# Patient Record
Sex: MALE | Race: BLACK OR AFRICAN AMERICAN | Employment: UNEMPLOYED | URBAN - METROPOLITAN AREA
[De-identification: names, ages, dates, MRNs, and addresses within clinical notes are randomized per-mention and may not be internally consistent; named-entity substitution may affect disease eponyms.]

---

## 2017-01-01 ENCOUNTER — APPOINTMENT (OUTPATIENT)
Dept: GENERAL RADIOLOGY | Facility: HOSPITAL | Age: 62
DRG: 441 | End: 2017-01-01
Attending: INTERNAL MEDICINE
Payer: MEDICARE

## 2017-01-01 ENCOUNTER — HOSPITAL ENCOUNTER (INPATIENT)
Facility: HOSPITAL | Age: 62
LOS: 1 days | Discharge: ANOTHER HOSPITAL | DRG: 441 | End: 2017-01-01
Attending: EMERGENCY MEDICINE | Admitting: HOSPITALIST
Payer: MEDICARE

## 2017-01-01 ENCOUNTER — APPOINTMENT (OUTPATIENT)
Dept: CT IMAGING | Facility: HOSPITAL | Age: 62
DRG: 441 | End: 2017-01-01
Attending: EMERGENCY MEDICINE
Payer: MEDICARE

## 2017-01-01 ENCOUNTER — HOSPITAL ENCOUNTER (INPATIENT)
Facility: HOSPITAL | Age: 62
LOS: 9 days | Discharge: NURSING, REHAB OR LONG-TERM CARE | DRG: 441 | End: 2018-01-01
Attending: EMERGENCY MEDICINE | Admitting: INTERNAL MEDICINE
Payer: MEDICARE

## 2017-01-01 ENCOUNTER — APPOINTMENT (OUTPATIENT)
Dept: GENERAL RADIOLOGY | Facility: HOSPITAL | Age: 62
DRG: 441 | End: 2017-01-01
Payer: MEDICARE

## 2017-01-01 ENCOUNTER — APPOINTMENT (OUTPATIENT)
Dept: ULTRASOUND IMAGING | Facility: HOSPITAL | Age: 62
DRG: 441 | End: 2017-01-01
Attending: PHYSICIAN ASSISTANT
Payer: MEDICARE

## 2017-01-01 ENCOUNTER — APPOINTMENT (OUTPATIENT)
Dept: GENERAL RADIOLOGY | Facility: HOSPITAL | Age: 62
DRG: 441 | End: 2017-01-01
Attending: EMERGENCY MEDICINE
Payer: MEDICARE

## 2017-01-01 VITALS
HEART RATE: 118 BPM | SYSTOLIC BLOOD PRESSURE: 122 MMHG | OXYGEN SATURATION: 94 % | TEMPERATURE: 98 F | RESPIRATION RATE: 14 BRPM | DIASTOLIC BLOOD PRESSURE: 52 MMHG

## 2017-01-01 DIAGNOSIS — Z99.2 END-STAGE RENAL DISEASE ON HEMODIALYSIS (HCC): ICD-10-CM

## 2017-01-01 DIAGNOSIS — N39.0 URINARY TRACT INFECTION WITH HEMATURIA, SITE UNSPECIFIED: ICD-10-CM

## 2017-01-01 DIAGNOSIS — N18.6 END-STAGE RENAL DISEASE ON HEMODIALYSIS (HCC): ICD-10-CM

## 2017-01-01 DIAGNOSIS — E87.79 OTHER HYPERVOLEMIA: ICD-10-CM

## 2017-01-01 DIAGNOSIS — K72.90 HEPATIC ENCEPHALOPATHY (HCC): Primary | ICD-10-CM

## 2017-01-01 DIAGNOSIS — R41.82 ALTERED MENTAL STATUS, UNSPECIFIED ALTERED MENTAL STATUS TYPE: Primary | ICD-10-CM

## 2017-01-01 DIAGNOSIS — K72.90 HEPATIC ENCEPHALOPATHY (HCC): ICD-10-CM

## 2017-01-01 DIAGNOSIS — E87.70 HYPERVOLEMIA, UNSPECIFIED HYPERVOLEMIA TYPE: ICD-10-CM

## 2017-01-01 DIAGNOSIS — R31.9 URINARY TRACT INFECTION WITH HEMATURIA, SITE UNSPECIFIED: ICD-10-CM

## 2017-01-01 LAB
ALBUMIN SERPL BCP-MCNC: 1.4 G/DL (ref 3.5–4.8)
ALBUMIN SERPL BCP-MCNC: 1.6 G/DL (ref 3.5–4.8)
ALBUMIN SERPL BCP-MCNC: 1.7 G/DL (ref 3.5–4.8)
ALBUMIN SERPL BCP-MCNC: 1.8 G/DL (ref 3.5–4.8)
ALBUMIN SERPL BCP-MCNC: 1.9 G/DL (ref 3.5–4.8)
ALBUMIN/GLOB SERPL: 0.4 {RATIO} (ref 1–2)
ALBUMIN/GLOB SERPL: 0.4 {RATIO} (ref 1–2)
ALBUMIN/GLOB SERPL: 0.5 {RATIO} (ref 1–2)
ALBUMIN/GLOB SERPL: 0.6 {RATIO} (ref 1–2)
ALP SERPL-CCNC: 129 U/L (ref 32–100)
ALP SERPL-CCNC: 133 U/L (ref 32–100)
ALP SERPL-CCNC: 143 U/L (ref 32–100)
ALP SERPL-CCNC: 171 U/L (ref 32–100)
ALP SERPL-CCNC: 201 U/L (ref 32–100)
ALT SERPL-CCNC: 28 U/L (ref 17–63)
ALT SERPL-CCNC: 30 U/L (ref 17–63)
ALT SERPL-CCNC: 35 U/L (ref 17–63)
ALT SERPL-CCNC: 42 U/L (ref 17–63)
ALT SERPL-CCNC: 44 U/L (ref 17–63)
AMMONIA PLAS-MCNC: 106 UG/DL (ref 28.2–80.4)
AMMONIA PLAS-MCNC: 126 UG/DL (ref 28.2–80.4)
AMMONIA PLAS-MCNC: 43 UG/DL (ref 28.2–80.4)
AMMONIA PLAS-MCNC: 527 UG/DL (ref 28.2–80.4)
ANION GAP SERPL CALC-SCNC: 10 MMOL/L (ref 0–18)
ANION GAP SERPL CALC-SCNC: 10 MMOL/L (ref 0–18)
ANION GAP SERPL CALC-SCNC: 11 MMOL/L (ref 0–18)
ANION GAP SERPL CALC-SCNC: 8 MMOL/L (ref 0–18)
ANION GAP SERPL CALC-SCNC: 8 MMOL/L (ref 0–18)
ANION GAP SERPL CALC-SCNC: 9 MMOL/L (ref 0–18)
APTT PPP: 35.6 SECONDS (ref 23.2–35.3)
AST SERPL-CCNC: 55 U/L (ref 15–41)
AST SERPL-CCNC: 64 U/L (ref 15–41)
AST SERPL-CCNC: 66 U/L (ref 15–41)
AST SERPL-CCNC: 82 U/L (ref 15–41)
AST SERPL-CCNC: 84 U/L (ref 15–41)
BASE EXCESS BLD CALC-SCNC: -4.1 MMOL/L (ref ?–2)
BASOPHILS # BLD: 0 K/UL (ref 0–0.2)
BASOPHILS # BLD: 0 K/UL (ref 0–0.2)
BASOPHILS # BLD: 0.1 K/UL (ref 0–0.2)
BASOPHILS NFR BLD: 1 %
BASOPHILS NFR BLD: 2 %
BILIRUB DIRECT SERPL-MCNC: 0.7 MG/DL (ref 0–0.2)
BILIRUB SERPL-MCNC: 1.8 MG/DL (ref 0.3–1.2)
BILIRUB SERPL-MCNC: 1.8 MG/DL (ref 0.3–1.2)
BILIRUB SERPL-MCNC: 2.1 MG/DL (ref 0.3–1.2)
BILIRUB SERPL-MCNC: 2.3 MG/DL (ref 0.3–1.2)
BILIRUB SERPL-MCNC: 2.8 MG/DL (ref 0.3–1.2)
BILIRUB UR QL: NEGATIVE
BUN SERPL-MCNC: 19 MG/DL (ref 8–20)
BUN SERPL-MCNC: 25 MG/DL (ref 8–20)
BUN SERPL-MCNC: 28 MG/DL (ref 8–20)
BUN SERPL-MCNC: 30 MG/DL (ref 8–20)
BUN SERPL-MCNC: 31 MG/DL (ref 8–20)
BUN SERPL-MCNC: 32 MG/DL (ref 8–20)
BUN/CREAT SERPL: 3.6 (ref 10–20)
BUN/CREAT SERPL: 3.7 (ref 10–20)
BUN/CREAT SERPL: 4.5 (ref 10–20)
BUN/CREAT SERPL: 4.7 (ref 10–20)
BUN/CREAT SERPL: 4.7 (ref 10–20)
BUN/CREAT SERPL: 4.8 (ref 10–20)
CALCIUM SERPL-MCNC: 7.4 MG/DL (ref 8.5–10.5)
CALCIUM SERPL-MCNC: 7.5 MG/DL (ref 8.5–10.5)
CALCIUM SERPL-MCNC: 7.8 MG/DL (ref 8.5–10.5)
CALCIUM SERPL-MCNC: 7.9 MG/DL (ref 8.5–10.5)
CALCIUM SERPL-MCNC: 7.9 MG/DL (ref 8.5–10.5)
CALCIUM SERPL-MCNC: 8 MG/DL (ref 8.5–10.5)
CHLORIDE SERPL-SCNC: 101 MMOL/L (ref 95–110)
CHLORIDE SERPL-SCNC: 103 MMOL/L (ref 95–110)
CHLORIDE SERPL-SCNC: 104 MMOL/L (ref 95–110)
CHLORIDE SERPL-SCNC: 104 MMOL/L (ref 95–110)
CHLORIDE SERPL-SCNC: 105 MMOL/L (ref 95–110)
CHLORIDE SERPL-SCNC: 105 MMOL/L (ref 95–110)
CO2 SERPL-SCNC: 19 MMOL/L (ref 22–32)
CO2 SERPL-SCNC: 20 MMOL/L (ref 22–32)
CO2 SERPL-SCNC: 21 MMOL/L (ref 22–32)
CO2 SERPL-SCNC: 21 MMOL/L (ref 22–32)
CO2 SERPL-SCNC: 23 MMOL/L (ref 22–32)
CO2 SERPL-SCNC: 23 MMOL/L (ref 22–32)
CREAT SERPL-MCNC: 5.34 MG/DL (ref 0.5–1.5)
CREAT SERPL-MCNC: 5.94 MG/DL (ref 0.5–1.5)
CREAT SERPL-MCNC: 6.51 MG/DL (ref 0.5–1.5)
CREAT SERPL-MCNC: 6.62 MG/DL (ref 0.5–1.5)
CREAT SERPL-MCNC: 6.71 MG/DL (ref 0.5–1.5)
CREAT SERPL-MCNC: 6.76 MG/DL (ref 0.5–1.5)
EOSINOPHIL # BLD: 0.2 K/UL (ref 0–0.7)
EOSINOPHIL # BLD: 0.3 K/UL (ref 0–0.7)
EOSINOPHIL NFR BLD: 3 %
EOSINOPHIL NFR BLD: 3 %
EOSINOPHIL NFR BLD: 4 %
EOSINOPHIL NFR BLD: 5 %
EOSINOPHIL NFR BLD: 7 %
ERYTHROCYTE [DISTWIDTH] IN BLOOD BY AUTOMATED COUNT: 18.8 % (ref 11–15)
ERYTHROCYTE [DISTWIDTH] IN BLOOD BY AUTOMATED COUNT: 18.9 % (ref 11–15)
ERYTHROCYTE [DISTWIDTH] IN BLOOD BY AUTOMATED COUNT: 19 % (ref 11–15)
ERYTHROCYTE [DISTWIDTH] IN BLOOD BY AUTOMATED COUNT: 19.2 % (ref 11–15)
ERYTHROCYTE [DISTWIDTH] IN BLOOD BY AUTOMATED COUNT: 19.4 % (ref 11–15)
ERYTHROCYTE [DISTWIDTH] IN BLOOD BY AUTOMATED COUNT: 19.6 % (ref 11–15)
GLOBULIN PLAS-MCNC: 3.3 G/DL (ref 2.5–3.7)
GLOBULIN PLAS-MCNC: 3.7 G/DL (ref 2.5–3.7)
GLOBULIN PLAS-MCNC: 3.9 G/DL (ref 2.5–3.7)
GLOBULIN PLAS-MCNC: 4.5 G/DL (ref 2.5–3.7)
GLUCOSE BLDC GLUCOMTR-MCNC: 101 MG/DL (ref 70–99)
GLUCOSE BLDC GLUCOMTR-MCNC: 109 MG/DL (ref 70–99)
GLUCOSE BLDC GLUCOMTR-MCNC: 112 MG/DL (ref 70–99)
GLUCOSE BLDC GLUCOMTR-MCNC: 74 MG/DL (ref 70–99)
GLUCOSE SERPL-MCNC: 118 MG/DL (ref 70–99)
GLUCOSE SERPL-MCNC: 121 MG/DL (ref 70–99)
GLUCOSE SERPL-MCNC: 126 MG/DL (ref 70–99)
GLUCOSE SERPL-MCNC: 144 MG/DL (ref 70–99)
GLUCOSE SERPL-MCNC: 145 MG/DL (ref 70–99)
GLUCOSE SERPL-MCNC: 83 MG/DL (ref 70–99)
GLUCOSE UR-MCNC: NEGATIVE MG/DL
HCO3 BLDA-SCNC: 19.9 MEQ/L (ref 21–27)
HCT VFR BLD AUTO: 29.3 % (ref 41–52)
HCT VFR BLD AUTO: 32.4 % (ref 41–52)
HCT VFR BLD AUTO: 33.2 % (ref 41–52)
HCT VFR BLD AUTO: 34.5 % (ref 41–52)
HCT VFR BLD AUTO: 35.4 % (ref 41–52)
HCT VFR BLD AUTO: 37 % (ref 41–52)
HGB BLD-MCNC: 10.4 G/DL (ref 13.5–17.5)
HGB BLD-MCNC: 11.1 G/DL (ref 13.5–17.5)
HGB BLD-MCNC: 11.3 G/DL (ref 13.5–17.5)
HGB BLD-MCNC: 11.8 G/DL (ref 13.5–17.5)
HGB BLD-MCNC: 12.1 G/DL (ref 13.5–17.5)
HGB BLD-MCNC: 9.9 G/DL (ref 13.5–17.5)
INR BLD: 1.4 (ref 0.9–1.2)
INR BLD: 1.5 (ref 0.9–1.2)
KETONES UR-MCNC: NEGATIVE MG/DL
LACTATE SERPL-SCNC: 2.6 MMOL/L (ref 0.5–2.2)
LACTATE SERPL-SCNC: 2.9 MMOL/L (ref 0.5–2.2)
LACTATE SERPL-SCNC: 3 MMOL/L (ref 0.5–2.2)
LACTATE SERPL-SCNC: 3.1 MMOL/L (ref 0.5–2.2)
LYMPHOCYTES # BLD: 0.9 K/UL (ref 1–4)
LYMPHOCYTES # BLD: 1 K/UL (ref 1–4)
LYMPHOCYTES # BLD: 1.1 K/UL (ref 1–4)
LYMPHOCYTES # BLD: 1.2 K/UL (ref 1–4)
LYMPHOCYTES # BLD: 1.5 K/UL (ref 1–4)
LYMPHOCYTES NFR BLD: 13 %
LYMPHOCYTES NFR BLD: 18 %
LYMPHOCYTES NFR BLD: 22 %
MAGNESIUM SERPL-MCNC: 1.7 MG/DL (ref 1.8–2.5)
MAGNESIUM SERPL-MCNC: 1.8 MG/DL (ref 1.8–2.5)
MCH RBC QN AUTO: 32.9 PG (ref 27–32)
MCH RBC QN AUTO: 33.4 PG (ref 27–32)
MCH RBC QN AUTO: 33.8 PG (ref 27–32)
MCH RBC QN AUTO: 33.8 PG (ref 27–32)
MCH RBC QN AUTO: 34 PG (ref 27–32)
MCH RBC QN AUTO: 34.2 PG (ref 27–32)
MCHC RBC AUTO-ENTMCNC: 32.2 G/DL (ref 32–37)
MCHC RBC AUTO-ENTMCNC: 32.8 G/DL (ref 32–37)
MCHC RBC AUTO-ENTMCNC: 32.8 G/DL (ref 32–37)
MCHC RBC AUTO-ENTMCNC: 33.3 G/DL (ref 32–37)
MCHC RBC AUTO-ENTMCNC: 33.4 G/DL (ref 32–37)
MCHC RBC AUTO-ENTMCNC: 33.7 G/DL (ref 32–37)
MCV RBC AUTO: 100.2 FL (ref 80–100)
MCV RBC AUTO: 101.4 FL (ref 80–100)
MCV RBC AUTO: 101.4 FL (ref 80–100)
MCV RBC AUTO: 102.3 FL (ref 80–100)
MCV RBC AUTO: 103 FL (ref 80–100)
MCV RBC AUTO: 103.6 FL (ref 80–100)
MODIFIED ALLEN TEST: POSITIVE
MONOCYTES # BLD: 0.8 K/UL (ref 0–1)
MONOCYTES # BLD: 0.8 K/UL (ref 0–1)
MONOCYTES # BLD: 0.9 K/UL (ref 0–1)
MONOCYTES # BLD: 1.3 K/UL (ref 0–1)
MONOCYTES # BLD: 1.5 K/UL (ref 0–1)
MONOCYTES NFR BLD: 15 %
MONOCYTES NFR BLD: 16 %
MONOCYTES NFR BLD: 17 %
MONOCYTES NFR BLD: 17 %
MONOCYTES NFR BLD: 19 %
MRSA DNA SPEC QL NAA+PROBE: NEGATIVE
NEUTROPHILS # BLD AUTO: 2.8 K/UL (ref 1.8–7.7)
NEUTROPHILS # BLD AUTO: 3.2 K/UL (ref 1.8–7.7)
NEUTROPHILS # BLD AUTO: 3.5 K/UL (ref 1.8–7.7)
NEUTROPHILS # BLD AUTO: 4.9 K/UL (ref 1.8–7.7)
NEUTROPHILS # BLD AUTO: 5.3 K/UL (ref 1.8–7.7)
NEUTROPHILS NFR BLD: 57 %
NEUTROPHILS NFR BLD: 58 %
NEUTROPHILS NFR BLD: 60 %
NEUTROPHILS NFR BLD: 62 %
NEUTROPHILS NFR BLD: 64 %
NITRITE UR QL STRIP.AUTO: NEGATIVE
O2 CT BLD-SCNC: 12.8 VOL% (ref 15–23)
O2/TOTAL GAS SETTING VFR VENT: 40 %
OSMOLALITY UR CALC.SUM OF ELEC: 281 MOSM/KG (ref 275–295)
OSMOLALITY UR CALC.SUM OF ELEC: 283 MOSM/KG (ref 275–295)
OSMOLALITY UR CALC.SUM OF ELEC: 285 MOSM/KG (ref 275–295)
OSMOLALITY UR CALC.SUM OF ELEC: 286 MOSM/KG (ref 275–295)
OSMOLALITY UR CALC.SUM OF ELEC: 286 MOSM/KG (ref 275–295)
OSMOLALITY UR CALC.SUM OF ELEC: 289 MOSM/KG (ref 275–295)
PCO2 BLDA: 34 MM HG (ref 35–45)
PEEP SETTING VENT: 5 CM H2O
PH BLDA: 7.39 [PH] (ref 7.35–7.45)
PH UR: 7 [PH] (ref 5–8)
PHOSPHATE SERPL-MCNC: 4.3 MG/DL (ref 2.4–4.7)
PHOSPHATE SERPL-MCNC: 4.7 MG/DL (ref 2.4–4.7)
PHOSPHATE SERPL-MCNC: 4.8 MG/DL (ref 2.4–4.7)
PLATELET # BLD AUTO: 101 K/UL (ref 140–400)
PLATELET # BLD AUTO: 107 K/UL (ref 140–400)
PLATELET # BLD AUTO: 115 K/UL (ref 140–400)
PLATELET # BLD AUTO: 86 K/UL (ref 140–400)
PLATELET # BLD AUTO: 92 K/UL (ref 140–400)
PLATELET # BLD AUTO: 95 K/UL (ref 140–400)
PMV BLD AUTO: 8 FL (ref 7.4–10.3)
PMV BLD AUTO: 8.4 FL (ref 7.4–10.3)
PMV BLD AUTO: 8.6 FL (ref 7.4–10.3)
PMV BLD AUTO: 8.9 FL (ref 7.4–10.3)
PMV BLD AUTO: 9 FL (ref 7.4–10.3)
PMV BLD AUTO: 9.4 FL (ref 7.4–10.3)
PO2 BLDA: 167 MM HG (ref 80–100)
PO2 SATN ADJ TO 0.5 BLD: 27 MMHG (ref 24–28)
POTASSIUM SERPL-SCNC: 3.6 MMOL/L (ref 3.3–5.1)
POTASSIUM SERPL-SCNC: 3.6 MMOL/L (ref 3.3–5.1)
POTASSIUM SERPL-SCNC: 3.8 MMOL/L (ref 3.3–5.1)
POTASSIUM SERPL-SCNC: 3.9 MMOL/L (ref 3.3–5.1)
POTASSIUM SERPL-SCNC: 4 MMOL/L (ref 3.3–5.1)
POTASSIUM SERPL-SCNC: 4.2 MMOL/L (ref 3.3–5.1)
PROCALCITONIN SERPL-MCNC: 2.94 NG/ML (ref ?–0.11)
PROT SERPL-MCNC: 5.2 G/DL (ref 5.9–8.4)
PROT SERPL-MCNC: 5.3 G/DL (ref 5.9–8.4)
PROT SERPL-MCNC: 5.5 G/DL (ref 5.9–8.4)
PROT SERPL-MCNC: 5.9 G/DL (ref 5.9–8.4)
PROT SERPL-MCNC: 6.2 G/DL (ref 5.9–8.4)
PROT UR-MCNC: 100 MG/DL
PROTHROMBIN TIME: 17 SECONDS (ref 11.8–14.5)
PROTHROMBIN TIME: 17.6 SECONDS (ref 11.8–14.5)
PUNCTURE CHARGE: YES
RBC # BLD AUTO: 2.89 M/UL (ref 4.5–5.9)
RBC # BLD AUTO: 3.17 M/UL (ref 4.5–5.9)
RBC # BLD AUTO: 3.28 M/UL (ref 4.5–5.9)
RBC # BLD AUTO: 3.35 M/UL (ref 4.5–5.9)
RBC # BLD AUTO: 3.53 M/UL (ref 4.5–5.9)
RBC # BLD AUTO: 3.57 M/UL (ref 4.5–5.9)
RBC #/AREA URNS AUTO: 28 /HPF
RESP RATE: 16 BPM
SAO2 % BLDA: >98.5 % (ref 94–100)
SODIUM SERPL-SCNC: 132 MMOL/L (ref 136–144)
SODIUM SERPL-SCNC: 134 MMOL/L (ref 136–144)
SODIUM SERPL-SCNC: 135 MMOL/L (ref 136–144)
SODIUM SERPL-SCNC: 136 MMOL/L (ref 136–144)
SP GR UR STRIP: 1.01 (ref 1–1.03)
SPECIMEN VOL 24H UR: 550 ML
UROBILINOGEN UR STRIP-ACNC: <2
VIT C UR-MCNC: NEGATIVE MG/DL
WBC # BLD AUTO: 4.9 K/UL (ref 4–11)
WBC # BLD AUTO: 5.5 K/UL (ref 4–11)
WBC # BLD AUTO: 5.7 K/UL (ref 4–11)
WBC # BLD AUTO: 6.2 K/UL (ref 4–11)
WBC # BLD AUTO: 8.1 K/UL (ref 4–11)
WBC # BLD AUTO: 8.3 K/UL (ref 4–11)
WBC #/AREA URNS AUTO: 89 /HPF

## 2017-01-01 PROCEDURE — 71010 XR CHEST AP PORTABLE  (CPT=71010): CPT | Performed by: EMERGENCY MEDICINE

## 2017-01-01 PROCEDURE — 99232 SBSQ HOSP IP/OBS MODERATE 35: CPT | Performed by: INTERNAL MEDICINE

## 2017-01-01 PROCEDURE — 99231 SBSQ HOSP IP/OBS SF/LOW 25: CPT | Performed by: INTERNAL MEDICINE

## 2017-01-01 PROCEDURE — 0BH17EZ INSERTION OF ENDOTRACHEAL AIRWAY INTO TRACHEA, VIA NATURAL OR ARTIFICIAL OPENING: ICD-10-PCS | Performed by: EMERGENCY MEDICINE

## 2017-01-01 PROCEDURE — 99223 1ST HOSP IP/OBS HIGH 75: CPT | Performed by: HOSPITALIST

## 2017-01-01 PROCEDURE — 5A1955Z RESPIRATORY VENTILATION, GREATER THAN 96 CONSECUTIVE HOURS: ICD-10-PCS | Performed by: EMERGENCY MEDICINE

## 2017-01-01 PROCEDURE — 99233 SBSQ HOSP IP/OBS HIGH 50: CPT | Performed by: INTERNAL MEDICINE

## 2017-01-01 PROCEDURE — 71010 XR CHEST AP PORTABLE  (CPT=71010): CPT | Performed by: INTERNAL MEDICINE

## 2017-01-01 PROCEDURE — 02HV33Z INSERTION OF INFUSION DEVICE INTO SUPERIOR VENA CAVA, PERCUTANEOUS APPROACH: ICD-10-PCS | Performed by: INTERNAL MEDICINE

## 2017-01-01 PROCEDURE — 76705 ECHO EXAM OF ABDOMEN: CPT | Performed by: PHYSICIAN ASSISTANT

## 2017-01-01 PROCEDURE — 99223 1ST HOSP IP/OBS HIGH 75: CPT | Performed by: OTHER

## 2017-01-01 PROCEDURE — 99223 1ST HOSP IP/OBS HIGH 75: CPT | Performed by: INTERNAL MEDICINE

## 2017-01-01 PROCEDURE — 70450 CT HEAD/BRAIN W/O DYE: CPT | Performed by: EMERGENCY MEDICINE

## 2017-01-01 PROCEDURE — 99291 CRITICAL CARE FIRST HOUR: CPT | Performed by: INTERNAL MEDICINE

## 2017-01-01 PROCEDURE — 5A1D70Z PERFORMANCE OF URINARY FILTRATION, INTERMITTENT, LESS THAN 6 HOURS PER DAY: ICD-10-PCS | Performed by: INTERNAL MEDICINE

## 2017-01-01 PROCEDURE — 71010 XR CHEST AP PORTABLE  (CPT=71010): CPT

## 2017-01-01 RX ORDER — SODIUM CHLORIDE 9 MG/ML
INJECTION, SOLUTION INTRAVENOUS
Status: DISPENSED
Start: 2017-01-01 | End: 2017-01-01

## 2017-01-01 RX ORDER — FOLIC ACID/VIT B COMPLEX AND C 0.8 MG
0.8 TABLET ORAL DAILY
Status: ON HOLD | COMMUNITY
End: 2018-01-01

## 2017-01-01 RX ORDER — IPRATROPIUM BROMIDE AND ALBUTEROL SULFATE 2.5; .5 MG/3ML; MG/3ML
3 SOLUTION RESPIRATORY (INHALATION)
Status: DISCONTINUED | OUTPATIENT
Start: 2017-01-01 | End: 2017-01-01

## 2017-01-01 RX ORDER — GABAPENTIN 100 MG/1
200 CAPSULE ORAL 2 TIMES DAILY
Status: DISCONTINUED | OUTPATIENT
Start: 2017-01-01 | End: 2017-01-01

## 2017-01-01 RX ORDER — SODIUM CHLORIDE 9 MG/ML
INJECTION, SOLUTION INTRAVENOUS
Status: COMPLETED
Start: 2017-01-01 | End: 2017-01-01

## 2017-01-01 RX ORDER — SODIUM CHLORIDE 0.9 % (FLUSH) 0.9 %
3 SYRINGE (ML) INJECTION AS NEEDED
Status: DISCONTINUED | OUTPATIENT
Start: 2017-01-01 | End: 2017-01-01

## 2017-01-01 RX ORDER — CINACALCET 30 MG/1
30 TABLET, FILM COATED ORAL
COMMUNITY

## 2017-01-01 RX ORDER — LACTULOSE 10 G/15ML
30 SOLUTION ORAL ONCE
Status: COMPLETED | OUTPATIENT
Start: 2017-01-01 | End: 2017-01-01

## 2017-01-01 RX ORDER — FINASTERIDE 5 MG/1
5 TABLET, FILM COATED ORAL DAILY
COMMUNITY

## 2017-01-01 RX ORDER — SODIUM CHLORIDE 0.9 % (FLUSH) 0.9 %
10 SYRINGE (ML) INJECTION AS NEEDED
Status: DISCONTINUED | OUTPATIENT
Start: 2017-01-01 | End: 2018-01-01

## 2017-01-01 RX ORDER — SODIUM CHLORIDE 9 MG/ML
INJECTION, SOLUTION INTRAVENOUS ONCE
Status: COMPLETED | OUTPATIENT
Start: 2017-01-01 | End: 2017-01-01

## 2017-01-01 RX ORDER — MIDODRINE HYDROCHLORIDE 5 MG/1
5 TABLET ORAL 2 TIMES DAILY PRN
Status: DISCONTINUED | OUTPATIENT
Start: 2017-01-01 | End: 2017-01-01

## 2017-01-01 RX ORDER — FLUTICASONE PROPIONATE AND SALMETEROL 100; 50 UG/1; UG/1
1 POWDER RESPIRATORY (INHALATION) 2 TIMES DAILY
Status: ON HOLD | COMMUNITY
End: 2018-01-01

## 2017-01-01 RX ORDER — PANTOPRAZOLE SODIUM 40 MG/1
40 TABLET, DELAYED RELEASE ORAL
Status: ON HOLD | COMMUNITY
End: 2018-01-01

## 2017-01-01 RX ORDER — MAGNESIUM SULFATE HEPTAHYDRATE 40 MG/ML
2 INJECTION, SOLUTION INTRAVENOUS ONCE
Status: COMPLETED | OUTPATIENT
Start: 2017-01-01 | End: 2017-01-01

## 2017-01-01 RX ORDER — ALBUMIN (HUMAN) 12.5 G/50ML
100 SOLUTION INTRAVENOUS AS NEEDED
Status: DISCONTINUED | OUTPATIENT
Start: 2017-01-01 | End: 2017-01-01

## 2017-01-01 RX ORDER — MIDODRINE HYDROCHLORIDE 5 MG/1
5 TABLET ORAL 2 TIMES DAILY PRN
COMMUNITY
End: 2018-01-01

## 2017-01-01 RX ORDER — FLUTICASONE PROPIONATE 50 MCG
2 SPRAY, SUSPENSION (ML) NASAL DAILY
COMMUNITY
End: 2018-01-01

## 2017-01-01 RX ORDER — LACTULOSE 10 G/15ML
20 SOLUTION ORAL 3 TIMES DAILY
COMMUNITY
End: 2018-01-01

## 2017-01-01 RX ORDER — ETOMIDATE 2 MG/ML
20 INJECTION INTRAVENOUS AS NEEDED
Status: DISCONTINUED | OUTPATIENT
Start: 2017-01-01 | End: 2017-01-01

## 2017-01-01 RX ORDER — ROCURONIUM BROMIDE 10 MG/ML
70 INJECTION, SOLUTION INTRAVENOUS ONCE
Status: COMPLETED | OUTPATIENT
Start: 2017-01-01 | End: 2017-01-01

## 2017-01-01 RX ORDER — SODIUM CHLORIDE 9 MG/ML
INJECTION, SOLUTION INTRAVENOUS CONTINUOUS
Refills: 0 | Status: SHIPPED | COMMUNITY
Start: 2017-01-01 | End: 2018-01-01

## 2017-01-01 RX ORDER — CINACALCET 30 MG/1
30 TABLET, FILM COATED ORAL
Status: DISCONTINUED | OUTPATIENT
Start: 2017-01-01 | End: 2017-01-01

## 2017-01-01 RX ORDER — SEVELAMER CARBONATE 800 MG/1
800 TABLET, FILM COATED ORAL
Status: DISCONTINUED | OUTPATIENT
Start: 2017-01-01 | End: 2018-01-01

## 2017-01-01 RX ORDER — SUCCINYLCHOLINE CHLORIDE 20 MG/ML
INJECTION INTRAMUSCULAR; INTRAVENOUS
Status: DISPENSED
Start: 2017-01-01 | End: 2017-01-01

## 2017-01-01 RX ORDER — LIDOCAINE HYDROCHLORIDE 10 MG/ML
0.5 INJECTION, SOLUTION INFILTRATION; PERINEURAL ONCE AS NEEDED
Status: ACTIVE | OUTPATIENT
Start: 2017-01-01 | End: 2017-01-01

## 2017-01-01 RX ORDER — FLUTICASONE PROPIONATE 50 MCG
2 SPRAY, SUSPENSION (ML) NASAL DAILY
Status: DISCONTINUED | OUTPATIENT
Start: 2017-01-01 | End: 2017-01-01

## 2017-01-01 RX ORDER — LACTULOSE 10 G/15ML
10 SOLUTION ORAL 2 TIMES DAILY
COMMUNITY
End: 2017-01-01

## 2017-01-01 RX ORDER — ETOMIDATE 2 MG/ML
INJECTION INTRAVENOUS
Status: COMPLETED
Start: 2017-01-01 | End: 2017-01-01

## 2017-01-01 RX ORDER — IPRATROPIUM BROMIDE AND ALBUTEROL SULFATE 2.5; .5 MG/3ML; MG/3ML
3 SOLUTION RESPIRATORY (INHALATION)
Refills: 0 | Status: SHIPPED | COMMUNITY
Start: 2017-01-01 | End: 2018-01-01

## 2017-01-01 RX ORDER — HYDROXYZINE HYDROCHLORIDE 25 MG/1
25 TABLET, FILM COATED ORAL 3 TIMES DAILY PRN
COMMUNITY
End: 2017-01-01

## 2017-01-01 RX ORDER — ROCURONIUM BROMIDE 10 MG/ML
INJECTION, SOLUTION INTRAVENOUS
Status: COMPLETED
Start: 2017-01-01 | End: 2017-01-01

## 2017-01-01 RX ORDER — ALBUMIN (HUMAN) 12.5 G/50ML
100 SOLUTION INTRAVENOUS AS NEEDED
Status: DISCONTINUED | OUTPATIENT
Start: 2017-01-01 | End: 2018-01-01

## 2017-01-01 RX ORDER — FINASTERIDE 5 MG/1
5 TABLET, FILM COATED ORAL DAILY
Status: DISCONTINUED | OUTPATIENT
Start: 2017-01-01 | End: 2017-01-01

## 2017-01-01 RX ORDER — LACTULOSE 10 G/15ML
30 SOLUTION ORAL 4 TIMES DAILY
Status: DISCONTINUED | OUTPATIENT
Start: 2017-01-01 | End: 2018-01-01

## 2017-01-01 RX ORDER — SODIUM CHLORIDE 9 MG/ML
INJECTION, SOLUTION INTRAVENOUS CONTINUOUS
Status: DISCONTINUED | OUTPATIENT
Start: 2017-01-01 | End: 2017-01-01

## 2017-01-01 RX ORDER — PANTOPRAZOLE SODIUM 40 MG/1
40 TABLET, DELAYED RELEASE ORAL
Status: DISCONTINUED | OUTPATIENT
Start: 2017-01-01 | End: 2017-01-01

## 2017-01-01 RX ORDER — IPRATROPIUM BROMIDE AND ALBUTEROL SULFATE 2.5; .5 MG/3ML; MG/3ML
SOLUTION RESPIRATORY (INHALATION)
Status: COMPLETED
Start: 2017-01-01 | End: 2017-01-01

## 2017-01-01 RX ORDER — SEVELAMER CARBONATE 800 MG/1
800 TABLET, FILM COATED ORAL
COMMUNITY
End: 2018-01-01

## 2017-01-01 RX ORDER — SODIUM CHLORIDE 9 MG/ML
INJECTION, SOLUTION INTRAVENOUS
Status: DISCONTINUED
Start: 2017-01-01 | End: 2017-01-01

## 2017-01-01 RX ORDER — GABAPENTIN 100 MG/1
200 CAPSULE ORAL 2 TIMES DAILY
COMMUNITY
End: 2018-01-01

## 2017-01-01 RX ORDER — MIDODRINE HYDROCHLORIDE 5 MG/1
5 TABLET ORAL
Status: DISCONTINUED | OUTPATIENT
Start: 2017-01-01 | End: 2018-01-01

## 2017-12-06 PROBLEM — E87.70 HYPERVOLEMIA: Status: ACTIVE | Noted: 2017-01-01

## 2017-12-06 PROBLEM — K72.90 HEPATIC ENCEPHALOPATHY (HCC): Status: ACTIVE | Noted: 2017-01-01

## 2017-12-06 PROBLEM — E87.79 OTHER HYPERVOLEMIA: Status: ACTIVE | Noted: 2017-01-01

## 2017-12-06 PROBLEM — R73.9 HYPERGLYCEMIA: Status: ACTIVE | Noted: 2017-01-01

## 2017-12-06 PROBLEM — Z99.2 END-STAGE RENAL DISEASE ON HEMODIALYSIS (HCC): Status: ACTIVE | Noted: 2017-01-01

## 2017-12-06 PROBLEM — N18.6 END-STAGE RENAL DISEASE ON HEMODIALYSIS (HCC): Status: ACTIVE | Noted: 2017-01-01

## 2017-12-06 NOTE — SLP NOTE
ADULT SWALLOWING EVALUATION    ASSESSMENT    ASSESSMENT/OVERALL IMPRESSION:  Pt seen sitting upright in bed for PO trials for BSSE. Pt required verbal cues for attention and participation.  Limited acceptance during session with patient biting down on spoon Imaging Results: 12/06 CXR demonstrated mod CHF noted with B infiltrates/edema and prominent markings with blunting of costophrenic angles. OBJECTIVE   ORAL MOTOR EXAMINATION  Dentition: Natural  Symmetry: Within Functional Limits  Strength:  Within F

## 2017-12-06 NOTE — PROGRESS NOTES
ADMISSION NOTE    57year old male with h/o ESRD, \"liver failure\"  presents with confusion. He was found to ammonia level of > 400 . He appears fluid overloaded as well. Was in acute respiratory distress when he arrived in PCU.   I paged Dr. Squire Shone t

## 2017-12-06 NOTE — ED PROVIDER NOTES
Patient Seen in: [facility] AND [facility] Emergency Department    History   Patient presents with:  Altered Mental Status (neurologic)    Stated Complaint: AMS     HPI    56 yo male with multiple medical problems including hepatitis and liver failure.  Has been motion. Neurological: He is alert. confused   Skin: Skin is warm and dry. Psychiatric: He has a normal mood and affect.  His behavior is normal.            ED Course     Labs Reviewed   BASIC METABOLIC PANEL (8) - Abnormal; Notable for the following: 112  Rhythm: Sinus Rhythm  Reading: low voltage, old anteroseptal infarct, anterior ST elevation. Nondiagnostic.             ED Course as of Dec 06 0237  ------------------------------------------------------------       [facility]     CHEST, SINGLE VIEW - 0025 HR hypervolemia    Disposition:  Admit  12/6/2017  2:36 am    Follow-up:  No follow-up provider specified.   We recommend that you schedule follow up care with a primary care provider within the next three months to obtain basic health screening including reas

## 2017-12-06 NOTE — CONSULTS
Gastroenterology consultation note    Reason for consultation:  Hepatic encephalopathy in the setting of HCV-induced cirrhosis and multiple comorbidities      History of present illness:   The patient is a 57year old male who is seen in critical care this precipitants for his encephalopathy with regards to no signs of bleeding or definitive infection. The patient was initially treated with BiPAP for his pulmonary edema.   He was given lactulose by enema as he is mental status did not allow oral lactulose Marital status: Single  Spouse name: N/A    Years of education: N/A  Number of children: N/A     Occupational History  None on file     Social History Main Topics   Smoking status: Unknown If Ever Smoked    Smokeless tobacco: Not on file    Alcohol use Not intracranial abnormality. Extensive calcifications involving the distal intracranial vertebral arteries and the cavernous internal carotid artery segments. There are also right temporal vascular calcifications.   The preliminary report was provided by [facility] discuss transfer there. Recommendations:  1. Continue lactulose enemas every 6 hours. Change to oral lactulose once able to take p.o.  2.  Monitor mental status. 3.  Await results of the abdominal ultrasound.   4.  Antibiotics and search for infecti

## 2017-12-06 NOTE — H&P
[facility]    PATIENT'S NAME: Maria Luz Mcdonnell PHYSICIAN: Kimberlyn Roque MD   PATIENT ACCOUNT#:   [id number]    LOCATION:  [address] [place] [address] RECORD #:   [id number]       YOB: 1955  ADMISSION DATE:       12/06/201 patient was transferred from the emergency room to the PCU where he was profoundly short of breath, required immediate BiPAP administration. The patient became agitated and bit through the BiPAP mask.   A second mask was applied and he tolerated that relat the BiPAP mask and more comfortable. VITAL SIGNS:  Temperature was 97.7, pulse on arrival to the PCU was 123 with a respiratory rate of 35, and a blood pressure of 134/64, O2 sat was 100%. HEENT:  Normocephalic, atraumatic. Pupils reactive to light. 2. ASSESSMENT AND PLAN:    1. Altered mental status, I suspect on the basis of hepatic encephalopathy. At this point, there is no obvious cause of infection. At this point, belly exam is soft with no apparent tenderness.   There is no evidence for an tolerate oral lactulose at this point. Enemas have been ordered instead. 9.   Altered mental status is likely secondary to hepatic encephalopathy; however, left gaze preference is puzzling.   Will order EEG to rule out seizure disorder and ask  _______

## 2017-12-06 NOTE — CONSULTS
[facility]    Report of Consultation    Verlean Poag Patient Status:  Inpatient    1955 MRN [id number]   Location [facility] 2W/SW Attending Lakesha Pierre MD   Hosp Day # 0 PCP No primary care provider on file.      Date 5 mg Oral Daily   Fluticasone Propionate (FLONASE) 50 MCG/ACT nasal spray 2 spray 2 spray Each Nare Daily   Fluticasone Furoate-Vilanterol (BREO ELLIPTA) 100-25 MCG/INH inhaler 1 puff 1 puff Inhalation Daily   gabapentin (NEURONTIN) cap 200 mg 200 mg Oral %.    Constitutional: Lethargic but arousable  HEENT: PERRL  Cardio: RRR, S1 S2  Respiratory: clear to auscultation bilaterally, no wheezing, rales, rhonchi, crackles  GI: abdomen soft  Extremities: no clubbing, cyanosis, edema  Neurologic: no gross motor Complicated past medical history including MSSA bacteremia, aortic valve insufficiency, endocarditis, epidural abscess, cirrhosis  -Encephalopathy may be multifactorial but given significant ammonia elevation will aggressively treat hepatic encephalopathy.

## 2017-12-06 NOTE — PLAN OF CARE
Diabetes/Glucose Control    • Glucose maintained within prescribed range Progressing        METABOLIC/FLUID AND ELECTROLYTES - ADULT    • Electrolytes maintained within normal limits Progressing        MUSCULOSKELETAL - ADULT    • Return mobility to safest

## 2017-12-06 NOTE — CONSULTS
[place] [facility]    Report of Consultation    Date of Admission:  12/6/2017  Date of Consult:  12/6/2017   Reason for Consultation:     ESRD on HD     History of Present Illness:     Logan Pollard is a 57 yrs old male with pmh of ESRD on HD MWF spray 2 spray Each Nare Daily   Fluticasone Furoate-Vilanterol (BREO ELLIPTA) 100-25 MCG/INH inhaler 1 puff 1 puff Inhalation Daily   hydrocortisone 2.5 % ointment  Topical BID   Midodrine HCl (PROAMATINE) tab 5 mg 5 mg Oral BID PRN   multivitamin stress f 75*   ALT  36  33   BILT  1.6*  1.6*   TP  7.1  6.5       INR   Date Value Ref Range Status   12/06/2017 1.3 (H) 0.9 - 1.2 Final   Comment:     Only the INR (not the Protime value) should be utilized for   the monitoring of oral anticoagulant therapy.

## 2017-12-06 NOTE — CONSULTS
[facility] ID CONSULT NOTE    Anoop Huddleston Patient Status:  Inpatient    1955 MRN [id number]   Location [facility] 2W/SW Attending Arnol Childers MD   Hosp Day # 0 PCP No primary care provider on file. lactulose (ENULOSE) 200 g in Sterile Water for Irrigation 1,000 mL retention enema, 200 g, Rectal, 4 times per day  •  ipratropium-albuterol (DUONEB) nebulizer solution 3 mL, 3 mL, Nebulization, 6 times per day  •  Cholecalciferol (VITAMIN D) 1000 units t 256*   AST  80*  75*   ALT  36  33   BILT  1.6*  1.6*   TP  7.1  6.5       Microbiology: Reviewed in EMR    Radiology: Reviewed    ASSESSMENT:    Antibiotics: Ceftriaxone    # AMS   -Elevated ammonia   # HCV cirrhosis with ascites    -Possible SBP?   # H/o

## 2017-12-07 NOTE — PROGRESS NOTES
Darin'ml patient from Jovita Ramirez RN at approx 2100 this evening. Patient in bed A&Ox2-alert to self and place. Follows commands but has tendencies for impulsive behaviors and bouts of confusion.    Darin'd a phone call from [facility] spoke with Rafita Arguello'

## 2017-12-07 NOTE — CONSULTS
Neurology Inpatient Consult Note    Kaden Guerrero : 1955   Referring Physician: David Bone  HPI:     Kaden Guerrero is a 57year old male who is being seen in neurologic evaluation. Patient being seen in eval for AMS.   Was admitted last carotid artery. Overall subtle luminal regularity of the intracranial   vasculature may be due to intracranial atherosclerotic disease.       LABS: reviewed   Ammonia 434    ASSESSMENT AND PLAN:   Altered mental state  Encephalopathy secondary to hyperamm

## 2017-12-27 PROBLEM — Z99.2 ESRD ON HEMODIALYSIS (HCC): Status: ACTIVE | Noted: 2017-01-01

## 2017-12-27 PROBLEM — N39.0 URINARY TRACT INFECTION WITH HEMATURIA, SITE UNSPECIFIED: Status: ACTIVE | Noted: 2017-01-01

## 2017-12-27 PROBLEM — R41.82 ALTERED MENTAL STATUS: Status: ACTIVE | Noted: 2017-01-01

## 2017-12-27 PROBLEM — R41.82 ALTERED MENTAL STATUS, UNSPECIFIED ALTERED MENTAL STATUS TYPE: Status: ACTIVE | Noted: 2017-01-01

## 2017-12-27 PROBLEM — E87.70 HYPERVOLEMIA, UNSPECIFIED HYPERVOLEMIA TYPE: Status: ACTIVE | Noted: 2017-01-01

## 2017-12-27 PROBLEM — N18.6 ESRD ON HEMODIALYSIS (HCC): Status: ACTIVE | Noted: 2017-01-01

## 2017-12-27 PROBLEM — R31.9 URINARY TRACT INFECTION WITH HEMATURIA, SITE UNSPECIFIED: Status: ACTIVE | Noted: 2017-01-01

## 2017-12-27 NOTE — CONSULTS
Gastroenterology consultation note    Reason for consultation:  Rapidly recurrent hepatic encephalopathy in the setting of hepatitis C induced cirrhosis and multiple comorbidities. History of present illness:   The patient is a 57year old male who is first dose of lactulose via enema. Lactulose has been appropriately ordered every 6 hours. Please see my prior consultation note for historical details.     Other pertinent recent issues include an acute deep venous thrombosis of the right upper extremi family history on file. A comprehensive 10 point review of systems was completed. Pertinent positives and negatives noted in the the HPI. Physical examination:  GEN: Unresponsive male currently intubated.   He does not appear to be in any acute d Date: 12/27/2017  CONCLUSION: 1. Cardiomegaly with rather advanced CHF similar to December 6, 2017. 2. Tortuous aorta. 3. Catheter in superior vena cava. 4. Endotracheal tube in good position. 5. Nasogastric tube in the stomach.  6. A preliminary report was

## 2017-12-27 NOTE — PROGRESS NOTES
ADULT PROGRESS NOTE 3    Patient name: Tejas Hanks  MRN: [id number]  : 1955  Date Patient Seen: 2017    I.  SUBJECTIVE         Patient intubated not responsive to verbal stimuli    II.  [address] 0700 102/40 - - 93 (!) 8 100 % - -   12/27/17 0652 - 97.7 °F (36.5 °C) - - - - - -   12/27/17 0600 125/46 - - 99 16 100 % - -   12/27/17 0530 - - - - 16 100 % - -   12/27/17 0528 - 97.7 °F (36.5 °C) Temporal - - - - -   12/27/17 0515 119/54 - - 98 16 100 % parenchymal volume loss with sequela of chronic microvascular ischemic disease. 3. There is extensive large vessel atherosclerosis involving the anterior and posterior circulations, as well as the right superficial temporal artery.   4. Extensive fluid is

## 2017-12-27 NOTE — CONSULTS
[facility]    Report of Consultation    Lennox Huntley Patient Status:  Inpatient    1955 MRN [id number]   Location [facility] 2W/SW Attending Hair Kendall MD   Hosp Day # 0 PCP No primary care provider on file.      D Carbonate 800 MG Oral Tab Take 800 mg by mouth 3 (three) times daily with meals. fluticasone-salmeterol 100-50 MCG/DOSE Inhalation Aerosol Powder, Breath Activated Inhale 1 puff into the lungs 2 (two) times daily.    Cholecalciferol 2000 units Oral Tab Ta 5.7 12/27/2017   HGB 11.1 (L) 12/27/2017   HCT 33.2 (L) 12/27/2017    (L) 12/27/2017   CREATSERUM 6.71 (H) 12/27/2017   BUN 25 (H) 12/27/2017    (L) 12/27/2017   K 4.2 12/27/2017    12/27/2017   CO2 23 12/27/2017    (H) 12/27/2017 status, lethargy within the last 24 hours.   Complicated past medical history including MSSA bacteremia, aortic valve insufficiency, endocarditis, epidural abscess, cirrhosis  -Encephalopathy may be multifactorial but given significant ammonia elevation day

## 2017-12-27 NOTE — ED PROVIDER NOTES
Patient Seen in: [facility] AND [facility] Emergency Department    History   Patient presents with:  Altered Mental Status (neurologic)    Stated Complaint: unresponsive     HPI  History is provided by EMS.     12-year-old male with history of CKD, liver failure, stimuli, no gag reflex   Skin: Skin is warm. Nursing note and vitals reviewed. ED Course     EKG    Rate, intervals and axes as noted on EKG Report.   Rate: 105  Rhythm: Sinus Rhythm  Reading:abnormal for rate, normal for rhythm    Cardiac Monito past 24 hour(s))  -RAINBOW DRAW BLUE   Collection Time: 12/27/17  4:19 AM   Result Value Ref Range   Hold Blue Auto Resulted    -RAINBOW DRAW LAVENDER   Collection Time: 12/27/17  4:19 AM   Result Value Ref Range   Hold Lavender Auto Resulted    -RAINBOW D WBC 5.7 4.0 - 11.0 K/UL   RBC 3.28 (L) 4.50 - 5.90 M/UL   HGB 11.1 (L) 13.5 - 17.5 g/dL   HCT 33.2 (L) 41.0 - 52.0 %   .4 (H) 80.0 - 100.0 fL   MCH 33.8 (H) 27.0 - 32.0 pg   MCHC 33.4 32.0 - 37.0 g/dl   RDW 19.4 (H) 11.0 - 15.0 %    (L) 140 has been placed with the tip 7 cm above the garth. NG tube extends below the diaphragm. Cardiac silhouette is upper limits in size. Intra-atrial septal occlusion device. Moderate left lower lobe atelectasis/evolving collapse has developed.    Bilat - informed him of pt admission  - discussed with Dr. Abi Dean - informed him of pt consult    The patient's wife was informed of their elevated blood pressure reading in the Emergency Department.   They were informed of the dangers of undiagnosed and untre type    Disposition:  Admit  12/27/2017  6:21 am    Follow-up:  No follow-up provider specified.   We recommend that you schedule follow up care with a primary care provider within the next three months to obtain basic health screening including reassessmen

## 2017-12-27 NOTE — PROGRESS NOTES
Zosyn (piperacillin/tazobactam) 3.375g IV q8h was ordered for Claire Turpin by Dr. Aureliano Hodge. Body mass index is 27.12 kg/m².   Wt Readings from Last 6 Encounters:  12/27/17 : 200 lb      Estimated Creatinine Clearance: 12.2 mL/min (based on SCr of 6.71 mg

## 2017-12-27 NOTE — PROGRESS NOTES
Vascular Access Note  Midline Catheter Insertion    Vascular Access Screening:   Allergies to Lidocaine: no  Allergies to Latex: no  Presence of Pacemaker/Defibrillator: No  Mastectomy with Lymph Node Dissection: No  AV Fistula / AV Graft: old AVF Lt arm  D

## 2017-12-27 NOTE — CM/SW NOTE
RONNA following up on d/c planning for the patient. Patient is intubated in CCU at this time. Patient has ESRD and a h/o prior transfer to [facility]. No contact information for the wife at this time and SW unable to obtain a social history.     Tj Villarreal

## 2017-12-27 NOTE — CONSULTS
[facility]    PATIENT'S NAME: Blaze Lind   ATTENDING PHYSICIAN: Boby Cardenas MD   CONSULTING PHYSICIAN: Ashleigh Carmona MD   PATIENT ACCOUNT#:   [id number]    LOCATION:  [facility] [address]  Drive #:   [id number]       DATE OF BIRTH:  [birth year], temperature 96.3, and O2 saturation was 100% on an FiO2 of 40%. LUNGS:  Occasional rhonchi. HEART:  Regular rate and rhythm. ABDOMEN:  Soft, flat, nontender without organomegaly, masses, or bruits. EXTREMITIES:  No edema.     LABORATORY DATA:  His B

## 2017-12-27 NOTE — PROGRESS NOTES
12/27/17 0740   Readings   Total RR 17   Minute Ventilation (L/min) 6.7 L/min   Expiratory Tidal Volume 670 mL   PIP Observed (cm H2O) 21 cm H2O   MAP (cm H2O) 9   Auto PEEP Observed (cm H2O) 7 cm H2O   Plateau Pressure (cm H2O) 9 cm H2O   Airway Resist

## 2017-12-27 NOTE — PLAN OF CARE
Problem: Safety Risk - Non-Violent Restraints  Goal: Patient will remain free from self-harm  INTERVENTIONS:  - Apply the least restrictive restraint to prevent harm  - Notify patient and family of reasons restraints applied  - Assess for any contributing ordered  - Monitor neurological status  Outcome: Not Progressing    Goal: Remains free of injury related to seizure activity  INTERVENTIONS:  - Maintain airway, patient safety  and administer oxygen as ordered  - Monitor patient for seizure activity, docum algorithm/standards of care as needed  Outcome: Not Progressing    Goal: Incision(s), wounds(s) or drain site(s) healing without S/S of infection  INTERVENTIONS:  - Assess and document risk factors for pressure ulcer development  - Assess and document skin

## 2017-12-28 NOTE — PROGRESS NOTES
[facility] [facility] - [facility]     Progress Note        Verlean Poag Patient Status:  Inpatient    1955 MRN [id number]   Location [facility] 2W/SW Attending Elisabet Mares M.D. Gary Moreland MD   Hosp Day # 1 PCP No primary care provider on file.        Sub Infusions:   • dexmedetomidine     • propofol Stopped (12/28/17 0900)   • norepinephrine 2.5 mcg/min (12/28/17 0900)       Physical Exam  Constitutional: no acute distress, intubated, sedated  HEENT: PERRL  Cardio: RRR, S1 S2  Respiratory: Diminished basil 2017. 2. Tortuous aorta. 3. Catheter in superior vena cava. 4. Endotracheal tube in good position. 5. Nasogastric tube in the stomach. 6. A preliminary report was submitted and there is agreement without major discrepancies.           Ekg 12-lead    Result this time which appears to be chronic in nature  -Aspirin recommended by hematology at [facility] for recent upper extremity DVT.   Not a good candidate for anticoagulation.  -We will attempt to wean sedation and tolerated spontaneous

## 2017-12-28 NOTE — PLAN OF CARE
Problem: Safety Risk - Non-Violent Restraints  Goal: Patient will remain free from self-harm  INTERVENTIONS:  - Apply the least restrictive restraint to prevent harm  - Notify patient and family of reasons restraints applied  - Assess for any contributing Glucose as ordered  - Assess for signs and symptoms of hyperglycemia and hypoglycemia  - Administer ordered medications to maintain glucose within target range  - Assess barriers to adequate nutritional intake and initiate nutrition consult as needed  - In

## 2017-12-28 NOTE — PROGRESS NOTES
[facility]     Gastroenterology Progress Note    Isaiah Eldridge Patient Status:  Inpatient    1955 MRN [id number]   Location [facility] 2W/SW Attending Cleaster Lefort M.D. Caroline Ren, MD   Hosp Day # 1 PCP No primary care provid -   12/27/17 2330 (!) 85/34 - - 85 19 100 % -   12/27/17 2315 (!) 86/32 - - 86 16 100 % -   12/27/17 2300 (!) 85/31 - - 86 21 100 % -   12/27/17 2230 91/32 - - 91 13 100 % -   12/27/17 2200 113/34 - - 94 17 100 % -   12/27/17 2100 114/31 - - 96 21 100 % - ALT  44  42   BILT  1.8*  2.8*   TP  5.9  6.2         Lab Results  Component Value Date   INR 1.5 (H) 12/28/2017   INR 1.4 (H) 12/27/2017   INR 1.3 (H) 12/06/2017         Component      Latest Ref Rng & Units 12/28/2017   AMMONIA      28.2 - 80.4 ug/dL 1 MD Elia Bassett  12/28/2017

## 2017-12-28 NOTE — PLAN OF CARE
Problem: RESPIRATORY - ADULT  Goal: Achieves optimal ventilation and oxygenation  INTERVENTIONS:  - Assess for changes in respiratory status  - Assess for changes in mentation and behavior  - Position to facilitate oxygenation and minimize respiratory effo Progressing      Problem: Patient Centered Care  Goal: Patient preferences are identified and integrated in the patient's plan of care  Interventions:  - What would you like us to know as we care for you? Involve patient's family in plan of care.   - Provid Angina  - Evaluate fluid balance, assess for edema, trend weights  Outcome: Progressing  Patient hemodynamically stable at this time. Patient sensitive to increased doses of propofol. Unable to bolus patient d/t patient's ESRD. Levophed started.    Goal: Ab

## 2017-12-29 NOTE — PLAN OF CARE
During HD, patient blood pressure drastically dropped. This RN, and dialysis RN discussed care. Propofol stopped, and Levophed titrated for goal MAP 60 and albumin x1 administer.  Patient requiring further pressor titration and another dose of albumin from

## 2017-12-29 NOTE — PROGRESS NOTES
[facility]    Progress Note    Bianca Lui Patient Status:  Inpatient    1955 MRN [id number]   Location [facility] 2W/SW Attending Theresa Smith MD   Hosp Day # 1 PCP No primary care provider on file.        Subjecti ([id number])    Result Date: 12/27/2017  CONCLUSION:  1. No acute intracranial process by noncontrast CT technique. 2. Senescent changes of parenchymal volume loss with sequela of chronic microvascular ischemic disease.   3. There is extensive large vessel athe

## 2017-12-29 NOTE — PROGRESS NOTES
ADULT PROGRESS NOTE 3    Patient name: Lexy Strong  MRN: [id number]  : 1955  Date Patient Seen: 2017    I.  SUBJECTIVE            II.  OBJECTIVE                   Current hospitals medications:   Current Facility-Administered Medications: 87 11 100 % -   12/28/17 0500 119/40 - - 95 (!) 8 100 % 179 lb 10.8 oz (81.5 kg)   12/28/17 0400 107/32 - - 96 13 100 % -   12/28/17 0300 95/40 - - 88 21 100 % -   12/28/17 0200 104/42 - - 94 16 100 % -   12/28/17 0100 112/32 - - 91 11 100 % -   12/28/17 0 CT technique. 2. Senescent changes of parenchymal volume loss with sequela of chronic microvascular ischemic disease.   3. There is extensive large vessel atherosclerosis involving the anterior and posterior circulations, as well as the right superficial t

## 2017-12-29 NOTE — PROGRESS NOTES
ADULT PROGRESS NOTE 3    Patient name: Rosangela Iraheta  MRN: [id number]  : 1955  Date Patient Seen: 2017    I.  SUBJECTIVE               II.  OBJECTIVE                   Current hospitals medications:   Current Facility-Administered Medications 12/28/17 2000 (!) 87/31 98.1 °F (36.7 °C) Temporal 78 12 100 % -   12/28/17 1900 94/36 - - 82 12 100 % -   12/28/17 1830 93/36 - - 83 13 100 % -   12/28/17 1800 (!) 89/34 - - 82 11 100 % -   12/28/17 1700 91/33 - - 85 15 100 % - 12/28/2017  CONCLUSION:  1. Cardiomegaly. Diffuse vascular congestion/pulmonary edema with slight interval clearing of interstitial infiltration/edema since yesterday's study. 2. Support devices in good position.  3. No pneumothorax         Ekg 12-lead    R

## 2017-12-29 NOTE — PROGRESS NOTES
[facility]  Nephrology Daily Progress Note    Lennox Huntley  [id number]  40 year old      HPI:   Lennox Huntley is a 57year old male. Intubated and sedated. Arouses to name.        ROS:     Constitutional:  Negative for decreased activity, skills appropriate for age    Labs:    Lab Results  Component Value Date   WBC 8.1 12/29/2017   HGB 11.3 12/29/2017   HCT 34.5 12/29/2017    12/29/2017   CREATSERUM 6.62 12/29/2017   BUN 30 12/29/2017    12/29/2017   K 4.0 12/29/2017    Diffuse vascular congestion/pulmonary edema with slight interval clearing of interstitial infiltration/edema since yesterday's study. 2. Support devices in good position.  3. No pneumothorax         Xr Chest Ap Portable  (cpt=71010)    Result Date: 12/27/20 hemodialysis (HCC)     Hypervolemia     Altered mental status     Altered mental status, unspecified altered mental status type     Urinary tract infection with hematuria, site unspecified     Hypervolemia, unspecified hypervolemia type     End-stage renal

## 2017-12-29 NOTE — CM/SW NOTE
RONNA spoke with the patient's wife today. The patient and his wife live in an apartment with 7 steps to enter. He has a cane/walker and is independent with his care.   He goes to dialysis at [facility] and has a h[facility]o [facility]

## 2017-12-29 NOTE — H&P
[address] [facility] Patient Status:  Inpatient    1955 MRN [id number]   Location [facility] 2W/SW Attending Naheed Tavarez MD   Hosp Day # 1 PCP No primary care provider on file.      Date: gabapentin 100 MG Oral Cap Take 200 mg by mouth 2 (two) times daily. Disp:  Rfl:  Unknown at Unknown time   hydrocortisone 2.5 % External Ointment Apply topically 2 (two) times daily.  Disp:  Rfl:  Unknown at Unknown time   Midodrine HCl 5 MG Oral Tab [facility] 12/28/2017   TP 6.2 12/28/2017   AST 82 12/28/2017   ALT 42 12/28/2017   INR 1.5 12/28/2017   MG 1.8 12/28/2017   PHOS 4.3 12/28/2017          Hepatic encephalopathy (HCC)  GI on consutation  On vent  Empiric antibiotic therapy urine and blood cultures pen

## 2017-12-29 NOTE — PROGRESS NOTES
[facility]     Gastroenterology Progress Note    Logan Pollard Patient Status:  Inpatient    1955 MRN [id number]   Location [facility] 2W/SW Attending Rabia Messer MD   [facility] Day # 2 PCP No primary care provid (!) 87/31 98.1 °F (36.7 °C) Temporal 78 12 100 % -   12/28/17 1900 94/36 - - 82 12 100 % -   12/28/17 1830 93/36 - - 83 13 100 % -   12/28/17 1800 (!) 89/34 - - 82 11 100 % -   12/28/17 1700 91/33 - - 85 15 100 % -   12/28/17 1600 97/32 99 °F (37.2 °C) Tem 12/27/2017  CONCLUSION:  1. No acute intracranial process by noncontrast CT technique. 2. Senescent changes of parenchymal volume loss with sequela of chronic microvascular ischemic disease.   3. There is extensive large vessel atherosclerosis involving th

## 2017-12-29 NOTE — PROGRESS NOTES
[facility]     Progress Note        [facility] Patient Status:  Inpatient    1955 MRN [id number]   Location [facility] 2W/SW Attending Sudheer Wang 44, MD   Hosp Day # 2 PCP No primary care provider on file.        Sub Stopped (12/29/17 5574)   • propofol 15 mcg/kg/min (12/29/17 5301)   • norepinephrine 8 mcg/min (12/29/17 1328)       Physical Exam  Constitutional: no acute distress, intubated, sedated  HEENT: PERRL  Cardio: RRR, S1 S2  Respiratory: Diminished basilar br aggressively treat hepatic encephalopathy.  Ammonia significantly improved. -CT head with no acute findings  -Chest x-ray with no obvious infiltrate seen.   UA suspicious for possible UTI. No evidence of leukocytosis or fevers noted.  Continue empiric ant

## 2017-12-30 NOTE — PLAN OF CARE
Patient/Family Goals    • Patient/Family Short Term Goal Not Progressing        Safety Risk - Non-Violent Restraints    • Patient will remain free from self-harm Not Progressing        During sedation vacation patient was agitated and trying to pull at ETT

## 2017-12-30 NOTE — PROGRESS NOTES
[facility]     Progress Note        [facility] Patient Status:  Inpatient    1955 MRN [id number]   Location [facility] 2W/SW Attending Leopoldo Payer M.D. Ellis Mallick, MD   Hosp Day # 3 PCP No primary care provider on file.        Sub (LEVOPHED) 4 mg in dextrose 5 % 250 mL infusion 0.5-30 mcg/min Intravenous Continuous   Normal Saline Flush 0.9 % injection 10 mL 10 mL Intravenous PRN       Continuous Infusions:   • vasopressin (PITRESSIN) infusion for shock Stopped (12/29/17 8250)   • n remain in place. 4. Right-sided PICC catheter tip at the right axillary vein. No pneumothorax         Xr Chest Ap Portable  (cpt=71010)    Result Date: 12/29/2017  CONCLUSION:  1. CHF with slight decrease in the amount of pulmonary edema.            Ekg 12- the time.    -Hemodialysis per nephrology recommendations  -Monitor anemia and thrombocytopenia at this time which appears to be chronic in nature  -Aspirin recommended by hematology at [facility] for recent upper extremity DVT.   Not

## 2017-12-30 NOTE — PROGRESS NOTES
[facility]     Gastroenterology Progress Note    Shahab Locus Patient Status:  Inpatient    1955 MRN [id number]   Location [facility] 2W/SW Attending Earnestine Lawrence, [address] [facility] Day # 3 PCP No primary care provid 0630 93/44 - - 65 16 100 % - -   12/30/17 0620 (!) 86/42 - - 61 16 100 % - -   12/30/17 0615 90/40 - - 62 16 100 % - -   12/30/17 0600 101/78 - - 69 16 100 % - -   12/30/17 0526 - - - - - - - 180 lb 5.4 oz (81.8 kg)   12/30/17 0500 95/66 - - 69 21 100 % - pulses. Exam reveals no gallop and no friction rub. No murmur heard. Edema not present. Pulmonary/Chest: Effort normal and breath sounds normal. No stridor. No respiratory distress. He has no wheezes. He has no rales. He exhibits no tenderness.    Br December 29, 2017. 2. Cardiomegaly. 3. Moderately advanced CHF with bilateral consolidation/atelectasis left greater than right. 4. Endotracheal tube in good position. 5. Catheter in superior vena cava. 6.  PICC line noted at the junction of the right subcl

## 2017-12-30 NOTE — PROGRESS NOTES
[facility]  Nephrology Daily Progress Note    Joe Wilson  [id number]  70 year old      HPI:   Joe Wilson is a 57year old male. Intubated, sedated and unresponsive. Still on pressors.        ROS:     Constitutional:  Negative for decr reflexes and motor skills appropriate for age    Labs:    Lab Results  Component Value Date   WBC 4.9 12/30/2017   HGB 10.4 12/30/2017   HCT 32.4 12/30/2017   PLT 86 12/30/2017   CREATSERUM 5.94 12/30/2017   BUN 28 12/30/2017    12/30/2017   K 3.6 12 tip at the right axillary vein. No pneumothorax         Xr Chest Ap Portable  (cpt=71010)    Result Date: 12/29/2017  CONCLUSION:  1. CHF with slight decrease in the amount of pulmonary edema.          Xr Chest Ap Portable  (cpt=71010)    Result Date: 12/28 400 ml   Net              756 ml          ASSESSMENT/PLAN:   Assessment   Patient Active Problem List:     Hyperglycemia     Hepatic encephalopathy (HCC)     ESRD on hemodialysis (HCC)     Hypervolemia     Altered mental status     Altered mental s

## 2017-12-31 NOTE — PLAN OF CARE
Safety Risk - Non-Violent Restraints    • Patient will remain free from self-harm Not Progressing        Pt will attempt to pull at tubes when unrestrained

## 2017-12-31 NOTE — PROGRESS NOTES
ADULT PROGRESS NOTE 3    Patient name: Kelli Wright  MRN: [id number]  : 1955  Date Patient Seen: 2017    I.  SUBJECTIVE      II.  OBJECTIVE                   Current hospitals medications:   Current Facility-Administered Medications:   •  va 107/41 - - 66 16 100 % -   12/31/17 0100 108/42 - - 66 16 100 % -   12/31/17 0000 113/47 97.8 °F (36.6 °C) Temporal 67 17 100 % -   12/30/17 2330 - - - 66 16 100 % -   12/30/17 2300 130/43 - - 69 16 100 % -   12/30/17 2200 132/50 - - 70 16 100 % -   12/30/ Cultures:     Imaging:  Xr Chest Ap Portable  (cpt=71010)    Result Date: 12/31/2017  CONCLUSION:  1. There are findings compatible with CHF and/or increased fluid volume status of the patient. 2.  Stable position of lines and tubes.   Stable high End-stage renal disease (ESRD) ([facility] [address]  Hypotension with dialysis   Patient status change to DNR/DNI         NEIL Eubanks MD  12/31/2017 10:32 AM

## 2017-12-31 NOTE — PROGRESS NOTES
[facility] [address]     Gastroenterology Progress Note    Altamease Andrea Patient Status:  Inpatient    1955 MRN [id number]   Location [facility] 2W/SW Attending Ilana Mesa MD   [facility] Day # 4 PCP No primary care provid 0236 - - - 65 17 100 % -   12/31/17 0200 107/41 - - 66 16 100 % -   12/31/17 0100 108/42 - - 66 16 100 % -   12/31/17 0000 113/47 97.8 °F (36.6 °C) Temporal 67 17 100 % -   12/30/17 2330 - - - 66 16 100 % -   12/30/17 2300 130/43 - - 69 16 100 % -   12/30/ are normal. He exhibits no distension, no fluid wave, no ascites and no mass. There is no hepatosplenomegaly. There is no tenderness. There is no CVA tenderness. No hernia. Musculoskeletal: Normal range of motion. He exhibits no tenderness.    Lymphadenop 12/30/2017  CONCLUSION:  1. Little change from December 29, 2017. 2. Cardiomegaly. 3. Moderately advanced CHF with bilateral consolidation/atelectasis left greater than right. 4. Endotracheal tube in good position. 5. Catheter in superior vena cava.  6. PIC

## 2017-12-31 NOTE — PROGRESS NOTES
[facility]     Progress Note        Markietato Jami Patient Status:  Inpatient    1955 MRN [id number]   Location [facility] 2W/SW Attending Ty Cruz MD   Hosp Day # 4 PCP No primary care provider on file.        Sub Bitartrate (LEVOPHED) 4 mg in dextrose 5 % 250 mL infusion 0.5-30 mcg/min Intravenous Continuous   Normal Saline Flush 0.9 % injection 10 mL 10 mL Intravenous PRN       Continuous Infusions:   • vasopressin (PITRESSIN) infusion for shock Stopped (12/29/17 Portable  (cpt=71010)    Result Date: 12/29/2017  CONCLUSION:  1. Cardiomegaly. Moderate CHF and/or pulmonary edema, unchanged.  2. Persistent left retrocardiac consolidation and or atelectasis and left effusion which may reflect congestive failure changes endocarditis valve insufficiency finished prolonged course of antibiotic therapy.  Primarily followed at [facility] care was recommended at the time.    -Hemodialysis per nephrology recommendations  -Monitor anemia and th

## 2017-12-31 NOTE — PROGRESS NOTES
[facility]  Nephrology Daily Progress Note    Pedro Schulte  [id number]  89 year old      HPI:   Pedro Schulte is a 57year old male. Isolated UF/HD in progress. BP requiring pressors. Intubated.  Arouses to tactile stimuli but seems confus reflexes and motor skills appropriate for age    Labs:    Lab Results  Component Value Date   WBC 5.5 12/31/2017   HGB 11.8 12/31/2017   HCT 35.4 12/31/2017    12/31/2017   CREATSERUM 6.76 12/31/2017   BUN 32 12/31/2017    12/31/2017   K 3.8 1 and axillary veins. 7. Nasogastric tube. Xr Chest Ap Portable  (cpt=71010)    Result Date: 12/29/2017  CONCLUSION:  1. Cardiomegaly. Moderate CHF and/or pulmonary edema, unchanged.  2. Persistent left retrocardiac consolidation and or atelectasis an PRN    Allergies:    Heparin                   Tramadol                  Vancomycin                  Input/Output:    Intake/Output Summary (Last 24 hours) at 12/31/17 1119  Last data filed at 12/31/17 0600   Gross per 24 hour   Intake           881.02 ml

## 2017-12-31 NOTE — DIETARY NOTE
ADULT NUTRITION INITIAL ASSESSMENT    Pt is at high nutrition risk. Pt does not meet malnutrition criteria. RECOMMENDATIONS TO MD:  EN start low rate and advance slower with concurrent pressor support.  Goal: Nepro 45 ml/hr- adjust formula and FWF i R31.9]  Altered mental status, unspecified altered mental status type [R41.82]  Hypervolemia, unspecified hypervolemia type [E87.70]      PERTINENT PAST MEDICAL HISTORY:   Past Medical History:   Diagnosis Date   • CKD (chronic kidney disease)    • Gout high skin risk    NUTRITION PRESCRIPTION:  Diet: NPO  Oral Supplements NPO  Estimated Nutritional Needs:    Calories: ~7556-4013 calories/day ( [place]/~22-25 calories per kg est dry wt 77 kg)  Protein:  grams protein/day (1.1-1.4 grams protein pe

## 2018-01-01 ENCOUNTER — APPOINTMENT (OUTPATIENT)
Dept: GENERAL RADIOLOGY | Facility: HOSPITAL | Age: 63
DRG: 441 | End: 2018-01-01
Attending: INTERNAL MEDICINE
Payer: MEDICARE

## 2018-01-01 VITALS
TEMPERATURE: 98 F | WEIGHT: 177.5 LBS | BODY MASS INDEX: 24.04 KG/M2 | HEART RATE: 88 BPM | SYSTOLIC BLOOD PRESSURE: 133 MMHG | HEIGHT: 72.01 IN | DIASTOLIC BLOOD PRESSURE: 68 MMHG | RESPIRATION RATE: 18 BRPM | OXYGEN SATURATION: 99 %

## 2018-01-01 LAB
ADENOVIRUS F 40/41 PCR: NEGATIVE
ALBUMIN SERPL BCP-MCNC: 1.5 G/DL (ref 3.5–4.8)
ALBUMIN SERPL BCP-MCNC: 1.5 G/DL (ref 3.5–4.8)
ALBUMIN SERPL BCP-MCNC: 1.7 G/DL (ref 3.5–4.8)
ALBUMIN SERPL BCP-MCNC: 1.7 G/DL (ref 3.5–4.8)
ALBUMIN/GLOB SERPL: 0.4 {RATIO} (ref 1–2)
ALP SERPL-CCNC: 127 U/L (ref 32–100)
ALP SERPL-CCNC: 133 U/L (ref 32–100)
ALP SERPL-CCNC: 135 U/L (ref 32–100)
ALP SERPL-CCNC: 138 U/L (ref 32–100)
ALT SERPL-CCNC: 29 U/L (ref 17–63)
ALT SERPL-CCNC: 29 U/L (ref 17–63)
ALT SERPL-CCNC: 30 U/L (ref 17–63)
ALT SERPL-CCNC: 30 U/L (ref 17–63)
ANION GAP SERPL CALC-SCNC: 10 MMOL/L (ref 0–18)
ANION GAP SERPL CALC-SCNC: 11 MMOL/L (ref 0–18)
ANION GAP SERPL CALC-SCNC: 12 MMOL/L (ref 0–18)
ANION GAP SERPL CALC-SCNC: 13 MMOL/L (ref 0–18)
AST SERPL-CCNC: 59 U/L (ref 15–41)
AST SERPL-CCNC: 64 U/L (ref 15–41)
AST SERPL-CCNC: 65 U/L (ref 15–41)
AST SERPL-CCNC: 69 U/L (ref 15–41)
ASTROVIRUS PCR: NEGATIVE
BASOPHILS # BLD: 0 K/UL (ref 0–0.2)
BASOPHILS # BLD: 0.1 K/UL (ref 0–0.2)
BASOPHILS NFR BLD: 0 %
BASOPHILS NFR BLD: 1 %
BILIRUB SERPL-MCNC: 1.8 MG/DL (ref 0.3–1.2)
BILIRUB SERPL-MCNC: 2.2 MG/DL (ref 0.3–1.2)
BILIRUB SERPL-MCNC: 2.7 MG/DL (ref 0.3–1.2)
BILIRUB SERPL-MCNC: 3 MG/DL (ref 0.3–1.2)
BUN SERPL-MCNC: 23 MG/DL (ref 8–20)
BUN SERPL-MCNC: 31 MG/DL (ref 8–20)
BUN SERPL-MCNC: 34 MG/DL (ref 8–20)
BUN SERPL-MCNC: 39 MG/DL (ref 8–20)
BUN/CREAT SERPL: 3.7 (ref 10–20)
BUN/CREAT SERPL: 4.5 (ref 10–20)
BUN/CREAT SERPL: 4.6 (ref 10–20)
BUN/CREAT SERPL: 4.7 (ref 10–20)
C CAYETANENSIS DNA SPEC QL NAA+PROBE: NEGATIVE
C. DIFFICILE TOXIN A/B PCR: NEGATIVE
CALCIUM SERPL-MCNC: 7.7 MG/DL (ref 8.5–10.5)
CALCIUM SERPL-MCNC: 7.9 MG/DL (ref 8.5–10.5)
CALCIUM SERPL-MCNC: 8.4 MG/DL (ref 8.5–10.5)
CALCIUM SERPL-MCNC: 8.9 MG/DL (ref 8.5–10.5)
CAMPY SP DNA.DIARRHEA STL QL NAA+PROBE: NEGATIVE
CHLORIDE SERPL-SCNC: 102 MMOL/L (ref 95–110)
CHLORIDE SERPL-SCNC: 104 MMOL/L (ref 95–110)
CHLORIDE SERPL-SCNC: 104 MMOL/L (ref 95–110)
CHLORIDE SERPL-SCNC: 105 MMOL/L (ref 95–110)
CO2 SERPL-SCNC: 18 MMOL/L (ref 22–32)
CO2 SERPL-SCNC: 19 MMOL/L (ref 22–32)
CO2 SERPL-SCNC: 20 MMOL/L (ref 22–32)
CO2 SERPL-SCNC: 21 MMOL/L (ref 22–32)
CREAT SERPL-MCNC: 6.19 MG/DL (ref 0.5–1.5)
CREAT SERPL-MCNC: 6.71 MG/DL (ref 0.5–1.5)
CREAT SERPL-MCNC: 7.61 MG/DL (ref 0.5–1.5)
CREAT SERPL-MCNC: 8.33 MG/DL (ref 0.5–1.5)
CRYPTOSP DNA SPEC QL NAA+PROBE: NEGATIVE
EAEC PAA PLAS AGGR+AATA ST NAA+NON-PRB: NEGATIVE
EC STX1+STX2 + H7 FLIC SPEC NAA+PROBE: NEGATIVE
ENTAMOEBA HISTOLYTICA PCR: NEGATIVE
EOSINOPHIL # BLD: 0.1 K/UL (ref 0–0.7)
EOSINOPHIL # BLD: 0.1 K/UL (ref 0–0.7)
EOSINOPHIL # BLD: 0.2 K/UL (ref 0–0.7)
EOSINOPHIL # BLD: 0.3 K/UL (ref 0–0.7)
EOSINOPHIL NFR BLD: 1 %
EOSINOPHIL NFR BLD: 2 %
EOSINOPHIL NFR BLD: 4 %
EOSINOPHIL NFR BLD: 5 %
EPEC EAE GENE STL QL NAA+NON-PROBE: NEGATIVE
ERYTHROCYTE [DISTWIDTH] IN BLOOD BY AUTOMATED COUNT: 18.2 % (ref 11–15)
ERYTHROCYTE [DISTWIDTH] IN BLOOD BY AUTOMATED COUNT: 18.7 % (ref 11–15)
ERYTHROCYTE [DISTWIDTH] IN BLOOD BY AUTOMATED COUNT: 18.8 % (ref 11–15)
ERYTHROCYTE [DISTWIDTH] IN BLOOD BY AUTOMATED COUNT: 18.9 % (ref 11–15)
ETEC LTA+ST1A+ST1B TOX ST NAA+NON-PROBE: NEGATIVE
FERRITIN SERPL IA-MCNC: 247 NG/ML (ref 24–336)
FOLATE SERPL-MCNC: 21.6 NG/ML
GIARDIA LAMBLIA PCR: NEGATIVE
GLOBULIN PLAS-MCNC: 3.4 G/DL (ref 2.5–3.7)
GLOBULIN PLAS-MCNC: 3.5 G/DL (ref 2.5–3.7)
GLOBULIN PLAS-MCNC: 3.9 G/DL (ref 2.5–3.7)
GLOBULIN PLAS-MCNC: 4.1 G/DL (ref 2.5–3.7)
GLUCOSE BLDC GLUCOMTR-MCNC: 119 MG/DL (ref 70–99)
GLUCOSE SERPL-MCNC: 102 MG/DL (ref 70–99)
GLUCOSE SERPL-MCNC: 127 MG/DL (ref 70–99)
GLUCOSE SERPL-MCNC: 135 MG/DL (ref 70–99)
GLUCOSE SERPL-MCNC: 141 MG/DL (ref 70–99)
HBV CORE AB SERPL QL IA: REACTIVE
HBV SURFACE AB SER-ACNC: <3.1 MIU/ML (ref ?–10)
HBV SURFACE AG SERPL QL IA: NONREACTIVE
HBV SURFACE AG SERPL QL IA: NONREACTIVE
HCT VFR BLD AUTO: 29.2 % (ref 41–52)
HCT VFR BLD AUTO: 29.8 % (ref 41–52)
HCT VFR BLD AUTO: 31.8 % (ref 41–52)
HCT VFR BLD AUTO: 32.6 % (ref 41–52)
HGB BLD-MCNC: 10.3 G/DL (ref 13.5–17.5)
HGB BLD-MCNC: 10.9 G/DL (ref 13.5–17.5)
HGB BLD-MCNC: 9.7 G/DL (ref 13.5–17.5)
HGB BLD-MCNC: 9.9 G/DL (ref 13.5–17.5)
IRON SATN MFR SERPL: 92 % (ref 20–55)
IRON SERPL-MCNC: 122 MCG/DL (ref 45–182)
LYMPHOCYTES # BLD: 0.9 K/UL (ref 1–4)
LYMPHOCYTES # BLD: 1 K/UL (ref 1–4)
LYMPHOCYTES # BLD: 1.1 K/UL (ref 1–4)
LYMPHOCYTES # BLD: 1.2 K/UL (ref 1–4)
LYMPHOCYTES NFR BLD: 12 %
LYMPHOCYTES NFR BLD: 14 %
LYMPHOCYTES NFR BLD: 15 %
LYMPHOCYTES NFR BLD: 22 %
MAGNESIUM SERPL-MCNC: 2 MG/DL (ref 1.8–2.5)
MAGNESIUM SERPL-MCNC: 2.2 MG/DL (ref 1.8–2.5)
MCH RBC QN AUTO: 33 PG (ref 27–32)
MCH RBC QN AUTO: 33.4 PG (ref 27–32)
MCH RBC QN AUTO: 33.6 PG (ref 27–32)
MCH RBC QN AUTO: 33.7 PG (ref 27–32)
MCHC RBC AUTO-ENTMCNC: 32.4 G/DL (ref 32–37)
MCHC RBC AUTO-ENTMCNC: 33.1 G/DL (ref 32–37)
MCHC RBC AUTO-ENTMCNC: 33.2 G/DL (ref 32–37)
MCHC RBC AUTO-ENTMCNC: 33.4 G/DL (ref 32–37)
MCV RBC AUTO: 100.4 FL (ref 80–100)
MCV RBC AUTO: 100.8 FL (ref 80–100)
MCV RBC AUTO: 101.6 FL (ref 80–100)
MCV RBC AUTO: 102.1 FL (ref 80–100)
MONOCYTES # BLD: 0.8 K/UL (ref 0–1)
MONOCYTES # BLD: 0.9 K/UL (ref 0–1)
MONOCYTES # BLD: 1 K/UL (ref 0–1)
MONOCYTES # BLD: 1 K/UL (ref 0–1)
MONOCYTES NFR BLD: 11 %
MONOCYTES NFR BLD: 11 %
MONOCYTES NFR BLD: 15 %
MONOCYTES NFR BLD: 17 %
NEUTROPHILS # BLD AUTO: 3.1 K/UL (ref 1.8–7.7)
NEUTROPHILS # BLD AUTO: 3.9 K/UL (ref 1.8–7.7)
NEUTROPHILS # BLD AUTO: 4.9 K/UL (ref 1.8–7.7)
NEUTROPHILS # BLD AUTO: 7.4 K/UL (ref 1.8–7.7)
NEUTROPHILS NFR BLD: 55 %
NEUTROPHILS NFR BLD: 65 %
NEUTROPHILS NFR BLD: 71 %
NEUTROPHILS NFR BLD: 77 %
NOROVIRUS GI/GII PCR: NEGATIVE
OSMOLALITY UR CALC.SUM OF ELEC: 285 MOSM/KG (ref 275–295)
OSMOLALITY UR CALC.SUM OF ELEC: 286 MOSM/KG (ref 275–295)
OSMOLALITY UR CALC.SUM OF ELEC: 289 MOSM/KG (ref 275–295)
OSMOLALITY UR CALC.SUM OF ELEC: 292 MOSM/KG (ref 275–295)
P SHIGELLOIDES DNA STL QL NAA+PROBE: NEGATIVE
PHOSPHATE SERPL-MCNC: 4.9 MG/DL (ref 2.4–4.7)
PHOSPHATE SERPL-MCNC: 4.9 MG/DL (ref 2.4–4.7)
PHOSPHATE SERPL-MCNC: 5 MG/DL (ref 2.4–4.7)
PHOSPHATE SERPL-MCNC: 5.7 MG/DL (ref 2.4–4.7)
PLATELET # BLD AUTO: 57 K/UL (ref 140–400)
PLATELET # BLD AUTO: 58 K/UL (ref 140–400)
PLATELET # BLD AUTO: 70 K/UL (ref 140–400)
PLATELET # BLD AUTO: 77 K/UL (ref 140–400)
PMV BLD AUTO: 7.9 FL (ref 7.4–10.3)
PMV BLD AUTO: 8 FL (ref 7.4–10.3)
PMV BLD AUTO: 8.4 FL (ref 7.4–10.3)
PMV BLD AUTO: 8.7 FL (ref 7.4–10.3)
POTASSIUM SERPL-SCNC: 3.2 MMOL/L (ref 3.3–5.1)
POTASSIUM SERPL-SCNC: 3.6 MMOL/L (ref 3.3–5.1)
POTASSIUM SERPL-SCNC: 3.9 MMOL/L (ref 3.3–5.1)
POTASSIUM SERPL-SCNC: 4.1 MMOL/L (ref 3.3–5.1)
PROT SERPL-MCNC: 4.9 G/DL (ref 5.9–8.4)
PROT SERPL-MCNC: 5 G/DL (ref 5.9–8.4)
PROT SERPL-MCNC: 5.6 G/DL (ref 5.9–8.4)
PROT SERPL-MCNC: 5.8 G/DL (ref 5.9–8.4)
RBC # BLD AUTO: 2.88 M/UL (ref 4.5–5.9)
RBC # BLD AUTO: 2.96 M/UL (ref 4.5–5.9)
RBC # BLD AUTO: 3.11 M/UL (ref 4.5–5.9)
RBC # BLD AUTO: 3.25 M/UL (ref 4.5–5.9)
ROTAVIRUS A PCR: NEGATIVE
SALMONELLA DNA SPEC QL NAA+PROBE: NEGATIVE
SAPOVIRUS PCR: NEGATIVE
SHIGELLA SP+EIEC IPAH ST NAA+NON-PROBE: NEGATIVE
SODIUM SERPL-SCNC: 133 MMOL/L (ref 136–144)
SODIUM SERPL-SCNC: 135 MMOL/L (ref 136–144)
SODIUM SERPL-SCNC: 135 MMOL/L (ref 136–144)
SODIUM SERPL-SCNC: 136 MMOL/L (ref 136–144)
TIBC SERPL-MCNC: 133 MCG/DL (ref 228–428)
TRANSFERRIN SERPL-MCNC: 101 MG/DL (ref 180–329)
V CHOLERAE DNA SPEC QL NAA+PROBE: NEGATIVE
VIBRIO DNA SPEC NAA+PROBE: NEGATIVE
VIT B12 SERPL-MCNC: >1500 PG/ML (ref 181–914)
WBC # BLD AUTO: 5.7 K/UL (ref 4–11)
WBC # BLD AUTO: 6 K/UL (ref 4–11)
WBC # BLD AUTO: 6.9 K/UL (ref 4–11)
WBC # BLD AUTO: 9.7 K/UL (ref 4–11)
YERSINIA DNA SPEC NAA+PROBE: NEGATIVE

## 2018-01-01 PROCEDURE — 71045 X-RAY EXAM CHEST 1 VIEW: CPT | Performed by: INTERNAL MEDICINE

## 2018-01-01 PROCEDURE — 99232 SBSQ HOSP IP/OBS MODERATE 35: CPT | Performed by: INTERNAL MEDICINE

## 2018-01-01 PROCEDURE — 99233 SBSQ HOSP IP/OBS HIGH 50: CPT | Performed by: INTERNAL MEDICINE

## 2018-01-01 PROCEDURE — 90935 HEMODIALYSIS ONE EVALUATION: CPT | Performed by: INTERNAL MEDICINE

## 2018-01-01 RX ORDER — 0.9 % SODIUM CHLORIDE 0.9 %
VIAL (ML) INJECTION
Status: COMPLETED
Start: 2018-01-01 | End: 2018-01-01

## 2018-01-01 RX ORDER — SEVELAMER CARBONATE 800 MG/1
800 TABLET, FILM COATED ORAL
Qty: 90 TABLET | Refills: 0 | Status: SHIPPED | OUTPATIENT
Start: 2018-01-01

## 2018-01-01 RX ORDER — LACTULOSE 10 G/15ML
30 SOLUTION ORAL 4 TIMES DAILY
Qty: 1 BOTTLE | Refills: 0 | Status: SHIPPED | OUTPATIENT
Start: 2018-01-01

## 2018-01-01 RX ORDER — FLUTICASONE PROPIONATE AND SALMETEROL 100; 50 UG/1; UG/1
1 POWDER RESPIRATORY (INHALATION) 2 TIMES DAILY
Qty: 1 EACH | Refills: 0 | Status: SHIPPED | OUTPATIENT
Start: 2018-01-01

## 2018-01-01 RX ORDER — FOLIC ACID/VIT B COMPLEX AND C 0.8 MG
0.8 TABLET ORAL DAILY
Qty: 30 TABLET | Refills: 0 | Status: SHIPPED | OUTPATIENT
Start: 2018-01-01

## 2018-01-01 RX ORDER — ALBUMIN (HUMAN) 12.5 G/50ML
100 SOLUTION INTRAVENOUS AS NEEDED
Status: DISCONTINUED | OUTPATIENT
Start: 2018-01-01 | End: 2018-01-01

## 2018-01-01 RX ORDER — SODIUM CHLORIDE 9 MG/ML
INJECTION, SOLUTION INTRAVENOUS
Status: DISPENSED
Start: 2018-01-01 | End: 2018-01-01

## 2018-01-01 RX ORDER — MIDODRINE HYDROCHLORIDE 5 MG/1
5 TABLET ORAL
Qty: 60 TABLET | Refills: 0 | Status: SHIPPED | OUTPATIENT
Start: 2018-01-01

## 2018-01-01 RX ORDER — PANTOPRAZOLE SODIUM 40 MG/1
40 TABLET, DELAYED RELEASE ORAL
Status: DISCONTINUED | OUTPATIENT
Start: 2018-01-01 | End: 2018-01-01

## 2018-01-01 RX ORDER — SEVELAMER CARBONATE 800 MG/1
800 TABLET, FILM COATED ORAL
Status: DISCONTINUED | OUTPATIENT
Start: 2018-01-01 | End: 2018-01-01

## 2018-01-01 RX ORDER — PANTOPRAZOLE SODIUM 40 MG/1
40 TABLET, DELAYED RELEASE ORAL
Qty: 30 TABLET | Refills: 0 | Status: SHIPPED | OUTPATIENT
Start: 2018-01-01

## 2018-01-01 NOTE — PROGRESS NOTES
[facility]  Nephrology Daily Progress Note    Betty Price  [id number]  89 year old      HPI:   Betty Price is a 57year old male. Was on BiPaP but just self extubated himself.   Doing OK but weak and may have trouble coughing up secreti age    Labs:    Lab Results  Component Value Date   WBC 5.7 01/01/2018   HGB 10.9 01/01/2018   HCT 32.6 01/01/2018   PLT 70 01/01/2018   CREATSERUM 6.71 01/01/2018   BUN 31 01/01/2018    01/01/2018   K 3.6 01/01/2018    01/01/2018   CO2 19 01/0 axillary vein. Xr Chest Ap Portable  (cpt=71010)    Result Date: 12/30/2017  CONCLUSION:  1. Little change from December 29, 2017. 2. Cardiomegaly. 3. Moderately advanced CHF with bilateral consolidation/atelectasis left greater than right.  4. End Pantoprazole Sodium (PROTONIX) 40 mg in Sodium Chloride 0.9 % 10 mL IV push, 40 mg, Intravenous, Daily  •  Norepinephrine Bitartrate (LEVOPHED) 4 mg in dextrose 5 % 250 mL infusion, 0.5-30 mcg/min, Intravenous, Continuous  •  Normal Saline Flush 0.9 % inje

## 2018-01-01 NOTE — PROGRESS NOTES
[place] [facility] - [facility]     Progress Note        Danny Ferrera Patient Status:  Inpatient    1955 MRN [id number]   Location [facility] 2W/SW Attending Bell Devine MD   Hosp Day # 5 PCP No primary care provider on file.        Sub tab 800 mg 800 mg Oral TID CC   Pantoprazole Sodium (PROTONIX) 40 mg in Sodium Chloride 0.9 % 10 mL IV push 40 mg Intravenous Daily   Norepinephrine Bitartrate (LEVOPHED) 4 mg in dextrose 5 % 250 mL infusion 0.5-30 mcg/min Intravenous Continuous   Normal S mellitus  9.  Hyponatremia  10.  Anemia  11.  Thrombocytopenia  12.  Gout  13. Lactic acidosis  14.   Right upper extremity DVT     Plan   -Patient presents with worsening altered mental status, lethargy.  Complicated past medical history including MSSA ba

## 2018-01-01 NOTE — PLAN OF CARE
Problem: Safety Risk - Non-Violent Restraints  Goal: Patient will remain free from self-harm  INTERVENTIONS:  - Apply the least restrictive restraint to prevent harm  - Notify patient and family of reasons restraints applied  - Assess for any contributing minimize risk of hemorrhage  - Monitor temperature, glucose, and sodium. Initiate appropriate interventions as ordered   Outcome: Not Progressing  Pt remains on vent,sedated for comfort from ETT.   When sedation lightened, pt attempting to pull at tube and

## 2018-01-01 NOTE — PROGRESS NOTES
ADULT PROGRESS NOTE 3    Patient name: Tatyana Michele  MRN: [id number]  : 1955  Date Patient Seen: 2018    I.  SUBJECTIVE      Awake trying to pull at vent tube       failed CPAP trial     II.   OBJECTIVE                   Current hospitals medic 64 16 100 % -   01/01/18 0100 94/51 - - 66 16 100 % -   01/01/18 0030 92/41 - - 64 16 100 % -   01/01/18 0000 (!) 88/39 (!) 96.8 °F (36 °C) Temporal 64 18 100 % -   12/31/17 2300 117/42 - - 65 16 100 % -   12/31/17 2200 111/40 - - 60 16 100 % -   12/31/17 the patient. 2.  Stable position of lines and tubes. Stable high position of the right upper from the PICC with tip in the right axillary vein.           Xr Chest Ap Portable  (cpt=71010)     Result Date: 12/30/2017  CONCLUSION:  1.  Little change from Dec

## 2018-01-02 NOTE — SLP NOTE
ADULT SWALLOWING EVALUATION    ASSESSMENT    ASSESSMENT/OVERALL IMPRESSION:  Pt seen sitting upright in bed for all PO trials for BSSE. Pt verbally refused 90 degree upright positioning stating \"that's enough\" three times to SLP at increased volume.  SLP lorenzo  Treatment Plan/Recommendations: Aspiration precautions  Discharge Recommendations/Plan: Undetermined    HISTORY   MEDICAL HISTORY  Reason for Referral: R/O aspiration    Problem List  Principal Problem:    Altered mental status, unspecified altered note: Silent aspiration cannot be evaluated clinically.  Videofluoroscopic Swallow Study is required to rule-out silent aspiration.)    Esophageal Phase of Swallow: No complaints consistent with possible esophageal involvement    GOALS  Goal #1 The patient

## 2018-01-02 NOTE — PROGRESS NOTES
ADULT PROGRESS NOTE 3    Patient name: Lance Medeiros  MRN: [id number]  : 1955  Date Patient Seen: 2018    I.  SUBJECTIVE        Self extubated yesterday.      II.  OBJECTIVE                   Current hospitals medications:   Current Facility-Adm 100 % -   01/02/18 0230 110/39 - - 96 20 - -   01/02/18 0200 102/33 - - - - 100 % -   01/02/18 0100 102/44 - - - - 100 % -   01/02/18 0030 (!) 87/52 - - 98 20 100 % -   01/02/18 0000 (!) 100/29 - - 90 20 100 % -   01/01/18 2330 (!) 89/46 - - 90 22 - -   01  01/02/2018   CA 7.7 01/02/2018   ALB 1.5 01/02/2018   ALKPHO 138 01/02/2018   BILT 1.8 01/02/2018   TP 5.0 01/02/2018   AST 64 01/02/2018   ALT 29 01/02/2018   MG 2.0 01/02/2018   PHOS 5.0 01/02/2018         Cultures:     Imaging:  Xr Chest Ap Po

## 2018-01-02 NOTE — PROGRESS NOTES
[facility]  Nephrology Daily Progress Note    Anoop Huddleston  [id number]  17 year old      HPI:   Anoop Huddleston is a 57year old male. Awake and alert. Comfortable. Tolerated self extubation yesterda.       ROS:     Constitutional:  Negative 01/02/2018   HGB 9.9 01/02/2018   HCT 29.8 01/02/2018   PLT 57 01/02/2018   CREATSERUM 7.61 01/02/2018   BUN 34 01/02/2018    01/02/2018   K 3.2 01/02/2018    01/02/2018   CO2 20 01/02/2018    01/02/2018   CA 7.7 01/02/2018   ALB 1.5 01/ (cpt=71010)    Result Date: 12/31/2017  CONCLUSION:  1. There are findings compatible with CHF and/or increased fluid volume status of the patient. 2.  Stable position of lines and tubes.   Stable high position of the right upper from the PICC with tip in sats 100% on 4 L.   Will arrange for HD in am. Replace K.           1/2/2018  Kelly Davis MD

## 2018-01-02 NOTE — PROGRESS NOTES
While admitting a new patient to the unit had to leave patient alone with restraints intact for admission. When returning after 10 minutes patient had extubated self. Paged Dr. Tennille Mccartney and informed.  Patient had been on CPAP trial most of the day while Preced

## 2018-01-02 NOTE — PROGRESS NOTES
[facility]     Progress Note        Isaiah Eldridge Patient Status:  Inpatient    1955 MRN [id number]   Location [facility] 2W/SW Attending Cleaster Lefort M.D. Caroline Ren, MD   Hosp Day # 6 PCP No primary care provider on file.        Sub PERRL  Cardio: RRR, S1 S2  Respiratory: Diminished basilar breath sounds with some crackles  GI: abdomen soft  Extremities: no clubbing, cyanosis, edema  Neurologic: no gross motor deficits  Skin: warm, dry      Results:       Lab Results  Component Value worsening altered mental status, lethargy.  Complicated past medical history including MSSA bacteremia, aortic valve insufficiency, endocarditis, epidural abscess, cirrhosis  -Encephalopathy may be multifactorial but given significant ammonia elevation day

## 2018-01-02 NOTE — PROGRESS NOTES
[facility]     Gastroenterology Progress Note    Tejas Hanks Patient Status:  Inpatient    1955 MRN [id number]   Location [facility] 2W/SW Attending Elisabet Moreland MD   [facility] Day # 6 PCP No primary care provid bleeding and rectal pain. Endocrine: Negative for cold intolerance and heat intolerance. Genitourinary: Positive for urgency and decreased urine volume. Negative for dysuria, frequency and hematuria.         Patient requests Howard catheter to be removed 1300 109/54 - - 77 21 100 % -   01/01/18 1230 117/38 - - 77 25 98 % -   01/01/18 1215 (!) 72/59 - - 77 19 100 % -   01/01/18 1200 (!) 63/50 - - 69 16 100 % -   01/01/18 1130 - - - - - 100 % -       Body mass index is 24.78 kg/m².     Physical Exam   Nursing 4149  01/02/18   0520   RBC  3.53*  3.25*  2.96*   HGB  11.8*  10.9*  9.9*   HCT  35.4*  32.6*  29.8*   MCV  100.2*  100.4*  100.8*   MCH  33.4*  33.6*  33.4*   MCHC  33.3  33.4  33.1   RDW  18.8*  18.9*  18.8*   WBC  5.5  5.7  6.0   PLT  101*  70*  57* of lines and tubes. Stable high position of the right upper from the PICC with tip in the right axillary vein.                 Preet العراقي  1/2/2018

## 2018-01-03 NOTE — PROGRESS NOTES
[facility]  Nephrology Daily Progress Note    Johnathon Flattery  [id number]  71 year old      HPI:   Kadenjonathan Guerrero is a 57year old male. Awake and alert. Comfortable at rest.  Just weak.        ROS:     Constitutional:  Negative for decreased Results  Component Value Date   WBC 9.7 01/03/2018   HGB 10.3 01/03/2018   HCT 31.8 01/03/2018   PLT 58 01/03/2018   CREATSERUM 8.33 01/03/2018   BUN 39 01/03/2018    01/03/2018   K 4.1 01/03/2018    01/03/2018   CO2 18 01/03/2018    01/ Medications:    Current Facility-Administered Medications:   •  rifaximin (XIFAXAN) tab 200 mg, 200 mg, Oral, BID  •  sevelamer (RENVELA) tab 800 mg, 800 mg, Oral, TID CC  •  lactulose (ENULOSE) 200 g in Sterile Water for Irrigation 1,000 mL retent

## 2018-01-03 NOTE — CM/SW NOTE
SW placed call to the pt's wife Blaze Montoya [phone] to discuss snf options, likely with in-house dialysis. Message left, awaiting response.  SW will begin preparing the documents for snf placement, including flowsheets, hepatitis B profile, chest xray, and o

## 2018-01-03 NOTE — PHYSICAL THERAPY NOTE
PHYSICAL THERAPY EVALUATION - INPATIENT     Room Number: 205/205-A  Evaluation Date: 1/3/2018  Type of Evaluation: Initial  Physician Order: PT Eval and Treat    Presenting Problem: Altered Mental Status, extubated  Reason for Therapy: Mobility Dysfunc Potential : Fair  Frequency (Obs): 3x/week  Number of Visits to Meet Established Goals: Trial    PHYSICAL THERAPY MEDICAL/SOCIAL HISTORY     History related to current admission: Patient with hx of gout, CKD, and liver failure.  Patient was intubated on thi awareness of need for assistance  · Awareness of Deficits:  not aware of deficits    RANGE OF MOTION AND STRENGTH ASSESSMENT  Upper extremity ROM and strength are within functional limits    Lower extremity ROM is within functional limits; limited ROM with concerns addressed    CURRENT GOALS    Goals to be met by: 1/10/18  Patient Goal Patient's self-stated goal is: to get up when he wants to get up    Goal #1 Patient is able to demonstrate supine - sit EOB @ level: minimum assistance     Goal #1   Current S

## 2018-01-03 NOTE — PROGRESS NOTES
[facility]     Gastroenterology Progress Note    Luis Enrique Ambrosio Patient Status:  Inpatient    1955 MRN [id number]   Location [facility] 2W/SW Attending Sherry Tompkins MD   [address] Day # 7 PCP No primary care provid stool, abdominal distention, anal bleeding and rectal pain. Endocrine: Negative for cold intolerance and heat intolerance. Genitourinary: Negative for dysuria. Musculoskeletal: Negative for back pain.         Complains of pain in buttocks and right hi 01/02/18 1230 117/48 - - 106 21 97 % -   01/02/18 1200 107/38 99 °F (37.2 °C) Temporal 101 19 96 % -   01/02/18 1130 109/50 - - 103 16 99 % -       Body mass index is 24.78 kg/m². Physical Exam   Nursing note and vitals reviewed.    Constitutional: He 2.96*  3.11*   HGB  10.9*  9.9*  10.3*   HCT  32.6*  29.8*  31.8*   MCV  100.4*  100.8*  102.1*   MCH  33.6*  33.4*  33.0*   MCHC  33.4  33.1  32.4   RDW  18.9*  18.8*  18.2*   WBC  5.7  6.0  9.7   PLT  70*  57*  58*         Recent Labs   Lab  01/01/18

## 2018-01-03 NOTE — PROGRESS NOTES
ADULT PROGRESS NOTE 3    Patient name: Claire Turpin  MRN: [id number]  : 1955  Date Patient Seen: 1/3/2018    I.  SUBJECTIVE          Sitting up in bed started on oral intake yesterday for dialysis today off pressors.     Vanessa Harrison 01/02/18 1900 133/69 - - 111 20 97 % -   01/02/18 1800 124/66 - - 111 19 96 % -   01/02/18 1700 129/66 - - 113 20 97 % -   01/02/18 1630 125/83 - - 110 24 97 % -   01/02/18 1600 113/46 98.7 °F (37.1 °C) Temporal 106 22 97 % -   01/02/18 1530 114/47 - - 1 PHOS 5.7 01/03/2018         Cultures:     Imaging:  Xr Chest Ap Portable  (cpt=71045)    Result Date: 1/2/2018  CONCLUSION:  1. Mild cardiomegaly and moderate bilateral pulmonary edema with slight interval worsening.  Persistent moderate left base consoli

## 2018-01-03 NOTE — PROGRESS NOTES
Patient refuses to let you reposition him. Writer has made several attempts by asking patient, as well as educate the patient on the importance of turning, and preventing wound/skin break down. Patient continues to refuse.

## 2018-01-03 NOTE — PROGRESS NOTES
Patient continues to refuse to let staff do a skin assessment and turn the patient. Patient also refused to let writer remove the flexiseal this morning.  Explained to the patient that it was ordered to be removed and he is not longer stooling, but he state

## 2018-01-03 NOTE — OCCUPATIONAL THERAPY NOTE
OCCUPATIONAL THERAPY EVALUATION - INPATIENT     Room Number: 205/205-A  Evaluation Date: 1/3/2018  Type of Evaluation: Initial  Presenting Problem: altered mental status    Physician Order: IP Consult to Occupational Therapy  Reason for Therapy: ADL/IADL D TBD    PLAN  OT Treatment Plan: Energy conservation/work simplification techniques;ADL training;UE strengthening/ROM; Endurance training;Functional transfer training;Balance activities;Cognitive reorientation;Patient/Family education       OCCUPATIONAL THER Errors:  assistance required to identify errors made and assistance required to correct errors made  Awareness of Deficits:  decreased awareness of deficits  Will continue to assess cognition formally with assessments and during functional tasks as patient Education Provided: plan of care, benefits of early mobilization and positional changes  Patient End of Session: In bed;Needs met;Call light within reach;RN aware of session/findings; All patient questions and concerns addressed      OT Goals    Patie

## 2018-01-03 NOTE — PROGRESS NOTES
[place] [facility] - [facility]     Progress Note        Carlos Red Patient Status:  Inpatient    1955 MRN [id number]   Location [facility] 2W/SW Attending Anabela Ruelas MD   Hosp Day # 7 PCP No primary care provider on file.        Sub S2  Respiratory: Faint basilar crackles  GI: abdomen soft  Extremities: no clubbing, cyanosis, edema  Neurologic: no gross motor deficits  Skin: warm, dry      Results:       Lab Results  Component Value Date   WBC 9.7 01/03/2018   HGB 10.3 01/03/2018   HC UTI. No evidence of leukocytosis or fevers noted.  Continue empiric antibiotics at this time although unclear if evidence of underlying sepsis.  Blood cultures and urine cultures reveal no growth to date.  Prior history of epidural abscess and endocarditis

## 2018-01-03 NOTE — PLAN OF CARE
NEUROLOGICAL - ADULT    • Achieves maximal functionality and self care   Not Progressing          NEUROLOGICAL - ADULT    • Absence of seizures  No seizure activity noted Progressing        RESPIRATORY - ADULT    • Achieves optimal ventilation and oxygenat

## 2018-01-04 PROBLEM — R29.898 SEVERE MUSCLE DECONDITIONING: Status: ACTIVE | Noted: 2018-01-01

## 2018-01-04 NOTE — PROGRESS NOTES
[facility]     Progress Note        [facility] Patient Status:  Inpatient    1955 MRN [id number]   Location [facility] 2W/SW Attending Leopoldo Payer M.D. Ellis Mallick, MD   Hosp Day # 8 PCP No primary care provider on file.        Sub edema  Neurologic: no gross motor deficits  Skin: warm, dry      Results:       Lab Results  Component Value Date   WBC 6.9 01/04/2018   HGB 9.7 01/04/2018   HCT 29.2 01/04/2018   PLT 77 01/04/2018   CREATSERUM 6.19 01/04/2018   BUN 23 01/04/2018    recs.  -PT  -DVT prophylaxis: Heparin  -Okay to transfer to floor later today. Anticipate discharge to rehab soon.     Jermaine Romero, DO  Pulmonary [address] [facility]

## 2018-01-04 NOTE — PROGRESS NOTES
Bed became available. Called and gave report to RN Francisco Strong from 3rd floor. Since room was still dirty upon giving report, transport was pending. Awaiting for transport. Patient notified of transfer.  Patient's wife, Radha Freeman was informed of patient getting cedeno

## 2018-01-04 NOTE — PROGRESS NOTES
[facility]     Gastroenterology Progress Note    Rosangela Iraheta Patient Status:  Inpatient    1955 MRN [id number]   Location [facility] 3W/SW Attending Deborah Maloney MD   [facility] Day # 8 PCP No primary care provid intolerance. Genitourinary: Negative for dysuria and urgency. Musculoskeletal: Negative for back pain. Skin: Negative for color change, pallor and rash. Allergic/Immunologic: Negative for environmental allergies and food allergies.    Neurological: tracheal deviation present. No thyromegaly present. Cardiovascular: Normal rate, regular rhythm, normal heart sounds and intact distal pulses. Exam reveals no gallop and no friction rub. No murmur heard. Edema not present.   Pulmonary/Chest: Effort (H) 12/28/2017   INR 1.4 (H) 12/27/2017   INR 1.3 (H) 12/06/2017       No results for input(s): HCV in the last 72 hours. Invalid input(s): HAV, HBV      Xr Chest Ap Portable  (cpt=71045)    Result Date: 1/2/2018  CONCLUSION:  1.  Mild cardiomegaly and m

## 2018-01-04 NOTE — PROGRESS NOTES
[facility]    Progress Note    Titus Mc Patient Status:  Inpatient    1955 MRN [id number]   Location [facility] 3W/SW Attending Mali Lincoln MD   Hosp Day # 8 PCP No primary care provider on file.        Subjecti deformities  Extremities:1+edema  Neurological:  Slowed mentation but not too bad    Results:     Laboratory Data:    Lab Results  Component Value Date   WBC 6.9 01/04/2018   HGB 9.7 (L) 01/04/2018   HCT 29.2 (L) 01/04/2018   PLT 77 (L) 01/04/2018   CREATS

## 2018-01-04 NOTE — PLAN OF CARE
Problem: RESPIRATORY - ADULT  Goal: Achieves optimal ventilation and oxygenation  INTERVENTIONS:  - Assess for changes in respiratory status  - Assess for changes in mentation and behavior  - Position to facilitate oxygenation and minimize respiratory effo patient/family to call for assistance with activity based on assessment   Outcome: Completed Date Met: 01/04/18    Goal: Achieves maximal functionality and self care  INTERVENTIONS  - Monitor swallowing and airway patency with patient fatigue and changes i range  INTERVENTIONS:  - Monitor Blood Glucose as ordered  - Assess for signs and symptoms of hyperglycemia and hypoglycemia  - Administer ordered medications to maintain glucose within target range  - Assess barriers to adequate nutritional intake and ini

## 2018-01-04 NOTE — PROGRESS NOTES
ADULT PROGRESS NOTE 3    Patient name: Lexy Strong  MRN: [id number]  : 1955  Date Patient Seen: 2018    I.  SUBJECTIVE       Denies hip or leg pains agreeable to participate with PT today.   Jamie Aguilar                   [facility] -   01/03/18 1300 115/50 97.8 °F (36.6 °C) Temporal 101 22 100 % -   01/03/18 1200 107/61 - - 102 17 99 % -   01/03/18 1100 124/47 - - 101 17 98 % -   01/03/18 1000 124/49 - - 107 20 99 % -   01/03/18 0900 113/51 - - 109 19 100 % -   01/03/18 0800 124/42 9 pressors    End-stage renal disease (ESRD) ([facility])   ---On HD M/W/F  Aortic Valve Insufficiency  s/p MRSA endocarditis  Remains on Empiric antibiotic coverage Zosyn day #8 urine and blood cultures negative.    Severe muscle deconditioning   To start bedside P

## 2018-01-04 NOTE — PLAN OF CARE
Problem: RESPIRATORY - ADULT  Goal: Achieves optimal ventilation and oxygenation  INTERVENTIONS:  - Assess for changes in respiratory status  - Assess for changes in mentation and behavior  - Position to facilitate oxygenation and minimize respiratory effo INTEGRITY - ADULT  Goal: Skin integrity remains intact  INTERVENTIONS  - Assess and document risk factors for pressure ulcer development  - Assess and document skin integrity  - Monitor for areas of redness and/or skin breakdown  - Initiate interventions, heart rate control medications as ordered  - Initiate emergency measures for life threatening arrhythmias  - Monitor electrolytes and administer replacement therapy as ordered   Outcome: Progressing  Patient's HR noted, on the 90's most of the time.  SBP wi

## 2018-01-05 PROBLEM — E87.70 HYPERVOLEMIA, UNSPECIFIED HYPERVOLEMIA TYPE: Status: RESOLVED | Noted: 2017-01-01 | Resolved: 2018-01-01

## 2018-01-05 PROBLEM — K72.90 HEPATIC ENCEPHALOPATHY (HCC): Status: RESOLVED | Noted: 2017-01-01 | Resolved: 2018-01-01

## 2018-01-05 PROBLEM — R31.9 URINARY TRACT INFECTION WITH HEMATURIA, SITE UNSPECIFIED: Status: RESOLVED | Noted: 2017-01-01 | Resolved: 2018-01-01

## 2018-01-05 PROBLEM — R41.82 ALTERED MENTAL STATUS: Status: RESOLVED | Noted: 2017-01-01 | Resolved: 2018-01-01

## 2018-01-05 PROBLEM — I35.1 NONRHEUMATIC AORTIC VALVE INSUFFICIENCY: Status: ACTIVE | Noted: 2018-01-01

## 2018-01-05 PROBLEM — R41.82 ALTERED MENTAL STATUS, UNSPECIFIED ALTERED MENTAL STATUS TYPE: Status: RESOLVED | Noted: 2017-01-01 | Resolved: 2018-01-01

## 2018-01-05 PROBLEM — N39.0 URINARY TRACT INFECTION WITH HEMATURIA, SITE UNSPECIFIED: Status: RESOLVED | Noted: 2017-01-01 | Resolved: 2018-01-01

## 2018-01-05 PROBLEM — B18.2 HEPATIC CIRRHOSIS DUE TO CHRONIC HEPATITIS C INFECTION (HCC): Status: ACTIVE | Noted: 2018-01-01

## 2018-01-05 PROBLEM — K74.60 HEPATIC CIRRHOSIS DUE TO CHRONIC HEPATITIS C INFECTION (HCC): Status: ACTIVE | Noted: 2018-01-01

## 2018-01-05 NOTE — CM/SW NOTE
1/5/18  Discharge planning   Met with pt and wife at bedside. Discharge planning discussed. Pt and wife agreed for pt to return to [facility] for short term rehab where has recently been to rehab. Referral and medical records faxed to [facility].   Spoke wi

## 2018-01-05 NOTE — PROGRESS NOTES
ADULT PROGRESS NOTE 3    Patient name: Megan Iglesias  MRN: [id number]  : 1955  Date Patient Seen: 2018    I.  SUBJECTIVE       Patient asleep resting comfortably     II.   OBJECTIVE                   Current hospitals medications:   Current Faci 01/05/18 0230 - - - 94 - - -   01/05/18 0514 - - - 93 - - -   01/05/18 0200 - - - 94 - - -   01/05/18 0145 - - - 96 - - -   01/05/18 0130 - - - 95 - - -   01/05/18 0659 - - - 96 - - -   01/05/18 0100 - - - 99 - - -   01/05/18 0045 - - - 98 - - -   01/05/ Review of testing:                Labs:         Cultures:     Imaging:  Xr Chest Ap Portable  (cpt=71045)    Result Date: 1/5/2018  CONCLUSION:  1. Favorable change since 1/2/2018 with decrease now trace to small bilateral pleural effusions.  2. Mild pers

## 2018-01-05 NOTE — SLP NOTE
SPEECH DAILY NOTE - INPATIENT    ASSESSMENT & PLAN   ASSESSMENT  Patient seen to monitor tolerance of PO diet.   Patient said he just ate, however, PCT reported he refused breakfast.  He agreed to small amounts of PO trials of current diet including cracker session(s). Family not available. Reviewed precautions with patient.   In Progress   Goal #3 The patient will utilize compensatory strategies as outlined by  BSSE (clinical evaluation) including Slow rate, Small bites, Small sips, Multiple swallows, Alt

## 2018-01-05 NOTE — PHYSICAL THERAPY NOTE
Attempted to see patient for physical therapy session, patient having dialysis at this time and then will be going to rehab.

## 2018-01-05 NOTE — PROGRESS NOTES
[facility] Note: Route Optimization for Pantoprazole (PROTONIX)    Patient is currently on Pantoprazole (PROTONIX) 40 mg IV every 24 hours.    The patient meets the criteria to convert to the oral equivalent as established by the IV to Oral conversion pro

## 2018-01-05 NOTE — PROGRESS NOTES
[facility]     Progress Note        Tatyana Michele Patient Status:  Inpatient    1955 MRN [id number]   Location [facility] 2W/SW Attending Carmine Claude M.D. Georjean Spell, MD   Hosp Day # 9 PCP No primary care provider on file.        Sub abdomen soft  Extremities: no clubbing, cyanosis, edema  Neurologic: no gross motor deficits  Skin: warm, dry      Results:          Xr Chest Ap Portable  (cpt=71045)    Result Date: 1/5/2018  CONCLUSION:  1.  Favorable change since 1/2/2018 with decrease n this time. Will sign off. Awaiting placement in rehab.     Gaurav Acosta, DO  Pulmonary [address] [facility]

## 2018-01-05 NOTE — OCCUPATIONAL THERAPY NOTE
Attempted to see patient for occupational therapy session, patient having dialysis at this time and then will be going to rehab this evening. Will follow up if patient is here over the weekend.

## 2018-01-05 NOTE — DISCHARGE SUMMARY
[place] [facility] - [facility]    Discharge Summary    Matthew Ibrahim Patient Status:  Inpatient    1955 MRN [id number]   Location [facility] 3W/SW Attending Theresa Mart MD   Hosp Day # 9 PCP No primary care provider on file.      Date These appear overall improved since prior. CT Head:    CONCLUSION:   1. No acute intracranial process by noncontrast CT technique. 2. Senescent changes of parenchymal volume loss with sequela of chronic microvascular ischemic disease.       3. T CHANGE how you take these medications      Instructions Prescription details   lactulose 10 GM/15ML Soln  Commonly known as:  siOPTICA Vivorte  What changed:  · how much to take  · when to take this      Take 45 mL (30 g total) by mouth 4 (four) times daily. FLONASE        gabapentin 100 MG Caps  Commonly known as:  NEURONTIN        hydrocortisone 2.5 % Oint        ipratropium-albuterol 0.5-2.5 (3) MG/3ML Soln  Commonly known as:  DUONEB  Replaced by:  Ipratropium-Albuterol  MCG/ACT Aers        sodium ch

## 2018-01-06 NOTE — PROGRESS NOTES
[place] [facility] - [facility]    Progress Note    Rosangela Iraheta Patient Status:  Inpatient    1955 MRN [id number]   Location [facility] 3W/SW Attending No att. providers found   [facility] Day # 9 PCP No primary care provider on file.        Subjectiv noted  Musculoskeletal: full ROM all extremities good strength  no deformities  Extremities: no edema, cyanosis  Neurological:  Grossly normal    Results:     Laboratory Data:    Lab Results  Component Value Date   WBC 6.9 01/04/2018   HGB 9.7 (L) 01/04/20

## 2018-01-06 NOTE — PLAN OF CARE
Ok to discharge patient to [facility]. Report given to [facility]. Tele and IV discontinued. Patient transported with all belongings. Family at bedside. Midline removed.

## 2023-11-01 NOTE — RESPIRATORY THERAPY NOTE
[facility] RESPIRATORY THERAPY MECHANICAL VENTILATION PROGRESS NOTE    Ventilator Weaning:  Patient meets criteria for weaning? no Weaning was attempted no using pressure support     Current Ventilator Data:  AC 16,550,5,40%      Therapist recommendations:   GIANNA rec
Possible spontaneous weaning trial after dialysis today per Dr. Thomas Dalton.
RESPIRATORY THERAPY MECHANICAL VENTILATION PROGRESS NOTE    Ventilator Weaning:  Patient meets criteria for weaning? no Weaning was attempted yes using pressure support 8 cmH2O + PEEP 5cmH2O. The patient tolerated poorly for 5min.  The patient was returned
RESPIRATORY THERAPY MECHANICAL VENTILATION PROGRESS NOTE    Ventilator Weaning:  Patient meets criteria for weaning? yes Weaning was attempted yes using pressure support 5 cmH2O + PEEP 5 cmH2O. The patient tolerated well for 1.5 hours.  The patient self ext
Diagnosis       Accelerated Junctional rhythm with occasional premature ventricular complexes  ST & T wave abnormality, consider lateral ischemia  When compared with ECG of 04-OCT-2023 09:52,  Junctional rhythm has replaced Sinus rhythm  ST no longer elevated in Anterior leads     CBC with Auto Differential    Collection Time: 10/31/23  5:54 PM   Result Value Ref Range    WBC 9.3 4.1 - 11.1 K/uL    RBC 4.05 (L) 4.10 - 5.70 M/uL    Hemoglobin 12.7 12.1 - 17.0 g/dL    Hematocrit 38.0 36.6 - 50.3 %    MCV 93.8 80.0 - 99.0 FL    MCH 31.4 26.0 - 34.0 PG    MCHC 33.4 30.0 - 36.5 g/dL    RDW 12.8 11.5 - 14.5 %    Platelets 213 (L) 517 - 400 K/uL    MPV 9.7 8.9 - 12.9 FL    Nucleated RBCs 0.0 0  WBC    nRBC 0.00 0.00 - 0.01 K/uL    Neutrophils % 77 (H) 32 - 75 %    Lymphocytes % 12 12 - 49 %    Monocytes % 6 5 - 13 %    Eosinophils % 4 0 - 7 %    Basophils % 1 0 - 1 %    Immature Granulocytes 0 0.0 - 0.5 %    Neutrophils Absolute 7.1 1.8 - 8.0 K/UL    Lymphocytes Absolute 1.1 0.8 - 3.5 K/UL    Monocytes Absolute 0.6 0.0 - 1.0 K/UL    Eosinophils Absolute 0.4 0.0 - 0.4 K/UL    Basophils Absolute 0.1 0.0 - 0.1 K/UL    Absolute Immature Granulocyte 0.0 0.00 - 0.04 K/UL    Differential Type AUTOMATED     Comprehensive Metabolic Panel    Collection Time: 10/31/23  5:54 PM   Result Value Ref Range    Sodium 140 136 - 145 mmol/L    Potassium 5.2 (H) 3.5 - 5.1 mmol/L    Chloride 108 97 - 108 mmol/L    CO2 27 21 - 32 mmol/L    Anion Gap 5 5 - 15 mmol/L    Glucose 218 (H) 65 - 100 mg/dL    BUN 36 (H) 6 - 20 MG/DL    Creatinine 1.89 (H) 0.70 - 1.30 MG/DL    Bun/Cre Ratio 19 12 - 20      Est, Glom Filt Rate 35 (L) >60 ml/min/1.73m2    Calcium 9.3 8.5 - 10.1 MG/DL    Total Bilirubin 1.1 (H) 0.2 - 1.0 MG/DL    ALT 19 12 - 78 U/L    AST 16 15 - 37 U/L    Alk Phosphatase 65 45 - 117 U/L    Total Protein 8.2 6.4 - 8.2 g/dL    Albumin 3.8 3.5 - 5.0 g/dL    Globulin 4.4 (H) 2.0 - 4.0 g/dL    Albumin/Globulin Ratio 0.9 (L) 1.1 - 2.2

## (undated) NOTE — IP AVS SNAPSHOT
Patient Demographics     Address  [address] [address] Phone  [phone] [facility])      Emergency Contact(s)     Name Relation Home Work Mobile    Daniel Spouse   [phone]    Escalantejeremy Griffithss Daughter   [phone]      Allergies a NEIL Chao MD         NEPHRO-RADHA 0.8 MG Tabs  Next dose due: Tomorrow morning      Take 1 tablet (0.8 mg total) by mouth daily. NEIL Chao MD         Pantoprazole Sodium 40 MG Tbec  Commonly known as:  PROTONIX  Next dose due:   Tomorrow [id number] Sodium Chloride 0.9 % solution 01/04/18 1730 Given      [id number] Sodium Chloride 0.9 % solution 01/05/18 0829 Given      [id number] lactulose (CHRONULAC) 10 GM/15ML solution 30 g 01/05/18 0828 Given      [id number] lactulose (CHRONULAC) 10 GM/15M GI Stool panel by PCR Once [[id number]]  (Normal) Collected:  01/02/18 1105    Order Status:  Completed Lab Status:  Final result Updated:  01/02/18 1243    Specimen:  Stool from Stool      Campylobacter Pcr Negative     C.  Difficile Toxin A/B Pcr Negative Emergency MRSA Screen by PCR STAT [[id number]]  (Normal) Collected:  12/27/17 0627    Order Status:  Completed Lab Status:  Final result Updated:  12/27/17 0745    Specimen:  Other from Nares      MRSA Screen By PCR Negative         H&P - H&P Note      H&P daily with meals. Disp:  Rfl:  Unknown at Unknown time   fluticasone-salmeterol 100-50 MCG/DOSE Inhalation Aerosol Powder, Breath Activated Inhale 1 puff into the lungs 2 (two) times daily.  Disp:  Rfl:  Unknown at Unknown time   Cholecalciferol 2000 units Head: Normocephalic  Lungs: clear to auscultation bilaterally  Heart: rhythm regular nl S1S2  Abdomen: Soft non tender with moderate distension and active bowel sounds,  Extremities: extremities normal, atraumatic, no cyanosis or edema  Neurologic: moving ADMISSION DATE:       12/27/2017      CONSULT DATE:  12/27/2017    REPORT OF CONSULTATION    HISTORY OF PRESENT ILLNESS:  The patient is a 77-year-old [facility] American male with a history of end-stage renal disease, on chronic hemodialysis, on a Monday, We LABORATORY DATA:  His BUN and creatinine are 25 and 6.71, respectively, with a sodium of 132, potassium 4.2, and a CO2 of 23. His ammonia level was 527. Initial lactic acid level was 2.9, now down to 2.6.   AST was elevated at 84 with a total bilirubin of Physical Therapy Note signed by Lara Freed PT at 1/5/2018  1:44 PM  Version 1 of 1    Author:  Lara Freed PT Service:  (none) Author Type:  Physical Therapist    Filed:  1/5/2018  1:44 PM Date of Service:  1/5/2018  1:43 PM Status:  Abdulkadir Olivares in pain and slightly resistive especially at right lower extremity. Reports pain in hip, did not rate. Patient with no initiation when attempting to reposition his shoulders.  Total assist required for bed mobility supine<>sit as patient was resistive to th Patient Owned Equipment: Rolling walker;Cane  Patient Regularly Uses: None[MD.2]    Prior Level of [place]: Patient was independent with functional mobility and ADLs using straight cane and rolling walker intermittently. Patient does not drive.  Lives How much difficulty does the patient currently have. .. [MD.1]  -   Turning over in bed (including adjusting bedclothes, sheets and blankets)?: A Lot   -   Sitting down on and standing up from a chair with arms (e.g., wheelchair, bedside commode, etc.): Unab Occupational Therapy Notes (last 72 hours) (Notes from 1/2/2018  5:24 PM through 1/5/2018  5:24 PM)      Occupational Therapy Note signed by Kermit Shah OT at 1/5/2018  1:55 PM  Version 1 of 1    Author:  Kermit Shah OT Service:  Rehab Author Violetta Sharpe demo'd BUE weakness and ROM WFL. Patient moved B legs to EOB w/ mod assistance, but would not lift trunk or use arms to pull himself up. Patient stated that he is comfortable in his positioning and declined remaining therapy activities.  Patient educated on Past Surgical History[EW.1]  History reviewed. No pertinent surgical history. [EW.3]    HOME SITUATION[EW.1]  Type of Home: Apartment  Home Layout: One level  Lives With: Spouse        Shower/Tub and Equipment: Walk-in shower;Grab bar             Drives: No RANGE OF MOTION   Upper extremity ROM is within functional limits     STRENGTH ASSESSMENT  Upper extremity strength is[EW.1] grossly limited; movements against gravity noted during session;  strength 3+/5[EW.2]    COORDINATION  Gross Motor: [place] Patient will complete toileting with[EW.1] mod A.[EW.2]  Comment:     Patient will tolerate standing for[EW.1] 3[EW.2] minutes in prep for adls PFUA[YN.5] mod I.[HX.2]   Comment:            Goals  on:[EW.1]  2018[EW.2]  Frequency:[EW.1] 3x/week Compensatory Strategies Recommended: Slow rate; Alternate consistencies;Small bites and sips;Multiple swallows; Pinched straw sips  Aspiration Precautions: Upright position; Slow rate;Small bites and sips  Medication Administration Recommendations: Whole in p Electronically signed by Belgica Farnsworth SLP on 1/5/2018 10:53 AM   Attribution Key    EV. 1 - KOKO Horton on 1/5/2018 10:42 AM  EV. 2 - KOKO Horton on 1/5/2018 10:47 AM  EV. 3 - Belgica Farnsworth SLP on 1/5/2018 10:43 AM

## (undated) NOTE — LETTER
[facility] [facility], [facility] [address]     I agree to have a Peripherally Inserted Central Catheter (PICC) placed in my arm.      1. The PICC insertion procedure, care, maintenance, risks, benefits, and complications Statement of Physician: My signature below affirms that prior to the time of the PICC line insertion, I have explained to the patient and/or his/her legal representative, the risks and benefits involved in the proposed treatment and any reasonable alternat

## (undated) NOTE — IP AVS SNAPSHOT
[facility]            (For Outpatient Use Only) Initial Admit Date: 12/27/2017   Inpt/Obs Admit Date: Inpt: 12/27/17 / Obs: N/A   Discharge Date:    Scott Ceja:  [de-identified]   MRN: [de-identified]   CSN: [id number]        [id number]  [facility] Account Financial Class: Medicare    January 5, 2018